# Patient Record
Sex: FEMALE | Race: WHITE | NOT HISPANIC OR LATINO | Employment: OTHER | URBAN - METROPOLITAN AREA
[De-identification: names, ages, dates, MRNs, and addresses within clinical notes are randomized per-mention and may not be internally consistent; named-entity substitution may affect disease eponyms.]

---

## 2017-04-03 ENCOUNTER — HISTORICAL (OUTPATIENT)
Dept: RADIOLOGY | Facility: HOSPITAL | Age: 62
End: 2017-04-03

## 2017-10-03 ENCOUNTER — HISTORICAL (OUTPATIENT)
Dept: ADMINISTRATIVE | Facility: HOSPITAL | Age: 62
End: 2017-10-03

## 2017-10-25 ENCOUNTER — HISTORICAL (OUTPATIENT)
Dept: LAB | Facility: HOSPITAL | Age: 62
End: 2017-10-25

## 2018-01-11 ENCOUNTER — HISTORICAL (OUTPATIENT)
Dept: LAB | Facility: HOSPITAL | Age: 63
End: 2018-01-11

## 2018-01-16 ENCOUNTER — HISTORICAL (OUTPATIENT)
Dept: RADIOLOGY | Facility: HOSPITAL | Age: 63
End: 2018-01-16

## 2018-01-19 ENCOUNTER — HISTORICAL (OUTPATIENT)
Dept: RADIOLOGY | Facility: HOSPITAL | Age: 63
End: 2018-01-19

## 2018-01-25 ENCOUNTER — HISTORICAL (OUTPATIENT)
Dept: ADMINISTRATIVE | Facility: HOSPITAL | Age: 63
End: 2018-01-25

## 2018-03-15 ENCOUNTER — HISTORICAL (OUTPATIENT)
Dept: HEMATOLOGY/ONCOLOGY | Facility: CLINIC | Age: 63
End: 2018-03-15

## 2018-05-16 ENCOUNTER — HISTORICAL (OUTPATIENT)
Dept: LAB | Facility: HOSPITAL | Age: 63
End: 2018-05-16

## 2018-05-16 ENCOUNTER — HISTORICAL (OUTPATIENT)
Dept: PREADMISSION TESTING | Facility: HOSPITAL | Age: 63
End: 2018-05-16

## 2018-05-21 ENCOUNTER — HISTORICAL (OUTPATIENT)
Dept: PREADMISSION TESTING | Facility: HOSPITAL | Age: 63
End: 2018-05-21

## 2018-05-22 ENCOUNTER — HISTORICAL (OUTPATIENT)
Dept: ADMINISTRATIVE | Facility: HOSPITAL | Age: 63
End: 2018-05-22

## 2018-07-13 ENCOUNTER — HISTORICAL (OUTPATIENT)
Dept: HEMATOLOGY/ONCOLOGY | Facility: CLINIC | Age: 63
End: 2018-07-13

## 2018-07-13 ENCOUNTER — HISTORICAL (OUTPATIENT)
Dept: LAB | Facility: HOSPITAL | Age: 63
End: 2018-07-13

## 2018-10-16 ENCOUNTER — HISTORICAL (OUTPATIENT)
Dept: ADMINISTRATIVE | Facility: HOSPITAL | Age: 63
End: 2018-10-16

## 2018-11-30 ENCOUNTER — HISTORICAL (OUTPATIENT)
Dept: HEMATOLOGY/ONCOLOGY | Facility: CLINIC | Age: 63
End: 2018-11-30

## 2019-03-29 ENCOUNTER — HISTORICAL (OUTPATIENT)
Dept: HEMATOLOGY/ONCOLOGY | Facility: CLINIC | Age: 64
End: 2019-03-29

## 2019-06-27 ENCOUNTER — HISTORICAL (OUTPATIENT)
Dept: ADMINISTRATIVE | Facility: HOSPITAL | Age: 64
End: 2019-06-27

## 2019-06-27 LAB — CRC RECOMMENDATION EXT: NORMAL

## 2019-10-11 ENCOUNTER — HISTORICAL (OUTPATIENT)
Dept: HEMATOLOGY/ONCOLOGY | Facility: CLINIC | Age: 64
End: 2019-10-11

## 2020-01-27 ENCOUNTER — HISTORICAL (OUTPATIENT)
Dept: LAB | Facility: HOSPITAL | Age: 65
End: 2020-01-27

## 2020-02-12 ENCOUNTER — HISTORICAL (OUTPATIENT)
Dept: ADMINISTRATIVE | Facility: HOSPITAL | Age: 65
End: 2020-02-12

## 2020-04-13 ENCOUNTER — HISTORICAL (OUTPATIENT)
Dept: HEMATOLOGY/ONCOLOGY | Facility: CLINIC | Age: 65
End: 2020-04-13

## 2020-05-27 ENCOUNTER — HISTORICAL (OUTPATIENT)
Dept: ADMINISTRATIVE | Facility: HOSPITAL | Age: 65
End: 2020-05-27

## 2020-07-22 ENCOUNTER — HISTORICAL (OUTPATIENT)
Dept: RADIOLOGY | Facility: HOSPITAL | Age: 65
End: 2020-07-22

## 2020-10-01 ENCOUNTER — HISTORICAL (OUTPATIENT)
Dept: ADMINISTRATIVE | Facility: HOSPITAL | Age: 65
End: 2020-10-01

## 2020-10-08 ENCOUNTER — HISTORICAL (OUTPATIENT)
Dept: HEMATOLOGY/ONCOLOGY | Facility: CLINIC | Age: 65
End: 2020-10-08

## 2020-10-08 LAB — CEA SERPL-MCNC: <1.73 NG/ML (ref 0–3)

## 2020-12-17 ENCOUNTER — HISTORICAL (OUTPATIENT)
Dept: ADMINISTRATIVE | Facility: HOSPITAL | Age: 65
End: 2020-12-17

## 2020-12-17 LAB
ALBUMIN SERPL-MCNC: 4.3 GM/DL (ref 3.4–4.8)
ALP SERPL-CCNC: 53 UNIT/L (ref 40–150)
ALT SERPL-CCNC: 32 UNIT/L (ref 0–55)
AST SERPL-CCNC: 33 UNIT/L (ref 5–34)
BILIRUB SERPL-MCNC: 0.5 MG/DL
BILIRUBIN DIRECT+TOT PNL SERPL-MCNC: 0.2 MG/DL (ref 0–0.5)
BILIRUBIN DIRECT+TOT PNL SERPL-MCNC: 0.3 MG/DL (ref 0–0.8)
CHOLEST SERPL-MCNC: 167 MG/DL
CHOLEST/HDLC SERPL: 2 {RATIO} (ref 0–5)
HDLC SERPL-MCNC: 87 MG/DL (ref 35–60)
LDLC SERPL CALC-MCNC: 69 MG/DL (ref 50–140)
LIVER PROFILE INTERP: NORMAL
PROT SERPL-MCNC: 6.7 GM/DL (ref 5.8–7.6)
TRIGL SERPL-MCNC: 57 MG/DL (ref 37–140)
VLDLC SERPL CALC-MCNC: 11 MG/DL

## 2021-04-14 ENCOUNTER — HISTORICAL (OUTPATIENT)
Dept: HEMATOLOGY/ONCOLOGY | Facility: CLINIC | Age: 66
End: 2021-04-14

## 2021-04-14 LAB
ABS NEUT (OLG): 3.22 X10(3)/MCL (ref 2.1–9.2)
ALBUMIN SERPL-MCNC: 4.3 GM/DL (ref 3.4–4.8)
ALBUMIN/GLOB SERPL: 1.6 RATIO (ref 1.1–2)
ALP SERPL-CCNC: 71 UNIT/L (ref 40–150)
ALT SERPL-CCNC: 26 UNIT/L (ref 0–55)
AST SERPL-CCNC: 31 UNIT/L (ref 5–34)
BASOPHILS # BLD AUTO: 0 X10(3)/MCL (ref 0–0.2)
BASOPHILS NFR BLD AUTO: 0.6 %
BILIRUB SERPL-MCNC: 0.4 MG/DL
BILIRUBIN DIRECT+TOT PNL SERPL-MCNC: 0.2 MG/DL (ref 0–0.5)
BILIRUBIN DIRECT+TOT PNL SERPL-MCNC: 0.2 MG/DL (ref 0–0.8)
BUN SERPL-MCNC: 14.7 MG/DL (ref 9.8–20.1)
CALCIUM SERPL-MCNC: 10.1 MG/DL (ref 8.4–10.2)
CEA SERPL-MCNC: <1.73 NG/ML (ref 0–3)
CHLORIDE SERPL-SCNC: 104 MMOL/L (ref 98–107)
CO2 SERPL-SCNC: 30 MMOL/L (ref 23–31)
CREAT SERPL-MCNC: 0.74 MG/DL (ref 0.55–1.02)
EOSINOPHIL # BLD AUTO: 0.1 X10(3)/MCL (ref 0–0.9)
EOSINOPHIL NFR BLD AUTO: 2.1 %
ERYTHROCYTE [DISTWIDTH] IN BLOOD BY AUTOMATED COUNT: 12.3 % (ref 11.5–17)
GLOBULIN SER-MCNC: 2.7 GM/DL (ref 2.4–3.5)
GLUCOSE SERPL-MCNC: 84 MG/DL (ref 82–115)
HCT VFR BLD AUTO: 40.7 % (ref 37–47)
HGB BLD-MCNC: 13.4 GM/DL (ref 12–16)
LYMPHOCYTES # BLD AUTO: 1.4 X10(3)/MCL (ref 0.6–4.6)
LYMPHOCYTES NFR BLD AUTO: 26.2 %
MCH RBC QN AUTO: 32.1 PG (ref 27–31)
MCHC RBC AUTO-ENTMCNC: 32.9 GM/DL (ref 33–36)
MCV RBC AUTO: 97.6 FL (ref 80–94)
MONOCYTES # BLD AUTO: 0.5 X10(3)/MCL (ref 0.1–1.3)
MONOCYTES NFR BLD AUTO: 10.1 %
NEUTROPHILS # BLD AUTO: 3.2 X10(3)/MCL (ref 2.1–9.2)
NEUTROPHILS NFR BLD AUTO: 61 %
PLATELET # BLD AUTO: 243 X10(3)/MCL (ref 130–400)
PMV BLD AUTO: 10.1 FL (ref 9.4–12.4)
POTASSIUM SERPL-SCNC: 4 MMOL/L (ref 3.5–5.1)
PROT SERPL-MCNC: 7 GM/DL (ref 5.8–7.6)
RBC # BLD AUTO: 4.17 X10(6)/MCL (ref 4.2–5.4)
SODIUM SERPL-SCNC: 145 MMOL/L (ref 136–145)
WBC # SPEC AUTO: 5.3 X10(3)/MCL (ref 4.5–11.5)

## 2021-04-20 ENCOUNTER — HISTORICAL (OUTPATIENT)
Dept: ADMINISTRATIVE | Facility: HOSPITAL | Age: 66
End: 2021-04-20

## 2021-04-20 LAB
ALBUMIN SERPL-MCNC: 4.2 GM/DL (ref 3.4–4.8)
ALBUMIN/GLOB SERPL: 1.6 RATIO (ref 1.1–2)
ALP SERPL-CCNC: 71 UNIT/L (ref 40–150)
ALT SERPL-CCNC: 26 UNIT/L (ref 0–55)
APPEARANCE, UA: CLEAR
AST SERPL-CCNC: 31 UNIT/L (ref 5–34)
BACTERIA SPEC CULT: ABNORMAL /HPF
BILIRUB SERPL-MCNC: 0.6 MG/DL
BILIRUB UR QL STRIP: NEGATIVE
BILIRUBIN DIRECT+TOT PNL SERPL-MCNC: 0.2 MG/DL (ref 0–0.5)
BILIRUBIN DIRECT+TOT PNL SERPL-MCNC: 0.4 MG/DL (ref 0–0.8)
BUN SERPL-MCNC: 16.1 MG/DL (ref 9.8–20.1)
CALCIUM SERPL-MCNC: 10.1 MG/DL (ref 8.4–10.2)
CHLORIDE SERPL-SCNC: 105 MMOL/L (ref 98–107)
CHOLEST SERPL-MCNC: 196 MG/DL
CHOLEST/HDLC SERPL: 2 {RATIO} (ref 0–5)
CO2 SERPL-SCNC: 28 MMOL/L (ref 23–31)
COLOR UR: YELLOW
CREAT SERPL-MCNC: 0.69 MG/DL (ref 0.55–1.02)
GLOBULIN SER-MCNC: 2.7 GM/DL (ref 2.4–3.5)
GLUCOSE (UA): NEGATIVE
GLUCOSE SERPL-MCNC: 84 MG/DL (ref 82–115)
HDLC SERPL-MCNC: 79 MG/DL (ref 35–60)
HGB UR QL STRIP: NEGATIVE
KETONES UR QL STRIP: NEGATIVE
LDLC SERPL CALC-MCNC: 95 MG/DL (ref 50–140)
LEUKOCYTE ESTERASE UR QL STRIP: ABNORMAL
NITRITE UR QL STRIP: NEGATIVE
PH UR STRIP: 6 [PH] (ref 5–9)
POTASSIUM SERPL-SCNC: 4.7 MMOL/L (ref 3.5–5.1)
PROT SERPL-MCNC: 6.9 GM/DL (ref 5.8–7.6)
PROT UR QL STRIP: NEGATIVE
RBC #/AREA URNS HPF: ABNORMAL /[HPF]
SODIUM SERPL-SCNC: 144 MMOL/L (ref 136–145)
SP GR UR STRIP: 1.02 (ref 1–1.03)
SQUAMOUS EPITHELIAL, UA: ABNORMAL /HPF
TRIGL SERPL-MCNC: 109 MG/DL (ref 37–140)
UROBILINOGEN UR STRIP-ACNC: 0.2
VLDLC SERPL CALC-MCNC: 22 MG/DL
WBC #/AREA URNS HPF: ABNORMAL /HPF

## 2021-10-12 ENCOUNTER — HISTORICAL (OUTPATIENT)
Dept: HEMATOLOGY/ONCOLOGY | Facility: CLINIC | Age: 66
End: 2021-10-12

## 2022-04-07 ENCOUNTER — HISTORICAL (OUTPATIENT)
Dept: ADMINISTRATIVE | Facility: HOSPITAL | Age: 67
End: 2022-04-07
Payer: MEDICARE

## 2022-04-11 ENCOUNTER — HISTORICAL (OUTPATIENT)
Dept: HEMATOLOGY/ONCOLOGY | Facility: CLINIC | Age: 67
End: 2022-04-11

## 2022-04-11 LAB
ABS NEUT (OLG): 4.49 (ref 2.1–9.2)
ALBUMIN SERPL-MCNC: 4.1 G/DL (ref 3.4–4.8)
ALBUMIN/GLOB SERPL: 1.5 {RATIO} (ref 1.1–2)
ALP SERPL-CCNC: 62 U/L (ref 40–150)
ALT SERPL-CCNC: 20 U/L (ref 0–55)
AST SERPL-CCNC: 25 U/L (ref 5–34)
BASOPHILS # BLD AUTO: 0 10*3/UL (ref 0–0.2)
BASOPHILS NFR BLD AUTO: 0.6 %
BILIRUB SERPL-MCNC: 0.4 MG/DL
BILIRUBIN DIRECT+TOT PNL SERPL-MCNC: 0.2 (ref 0–0.5)
BILIRUBIN DIRECT+TOT PNL SERPL-MCNC: 0.2 (ref 0–0.8)
BUN SERPL-MCNC: 17.7 MG/DL (ref 9.8–20.1)
CALCIUM SERPL-MCNC: 10.3 MG/DL (ref 8.7–10.5)
CEA SERPL-MCNC: <1.73 NG/ML (ref 0–3)
CHLORIDE SERPL-SCNC: 105 MMOL/L (ref 98–107)
CO2 SERPL-SCNC: 25 MMOL/L (ref 23–31)
CREAT SERPL-MCNC: 0.73 MG/DL (ref 0.55–1.02)
EOSINOPHIL # BLD AUTO: 0.3 10*3/UL (ref 0–0.9)
EOSINOPHIL NFR BLD AUTO: 3.8 %
ERYTHROCYTE [DISTWIDTH] IN BLOOD BY AUTOMATED COUNT: 12.1 % (ref 11.5–17)
GLOBULIN SER-MCNC: 2.8 G/DL (ref 2.4–3.5)
GLUCOSE SERPL-MCNC: 96 MG/DL (ref 82–115)
HCT VFR BLD AUTO: 38.7 % (ref 37–47)
HEMOLYSIS INTERF INDEX SERPL-ACNC: 10
HGB BLD-MCNC: 12.9 G/DL (ref 12–16)
ICTERIC INTERF INDEX SERPL-ACNC: 1
LIPEMIC INTERF INDEX SERPL-ACNC: 23
LYMPHOCYTES # BLD AUTO: 1.8 10*3/UL (ref 0.6–4.6)
LYMPHOCYTES NFR BLD AUTO: 24.6 %
MANUAL DIFF? (OHS): NO
MCH RBC QN AUTO: 32 PG (ref 27–31)
MCHC RBC AUTO-ENTMCNC: 33.3 G/DL (ref 33–36)
MCV RBC AUTO: 96 FL (ref 80–94)
MONOCYTES # BLD AUTO: 0.6 10*3/UL (ref 0.1–1.3)
MONOCYTES NFR BLD AUTO: 8.5 %
NEUTROPHILS # BLD AUTO: 4.5 10*3/UL (ref 2.1–9.2)
NEUTROPHILS NFR BLD AUTO: 62.2 %
PLATELET # BLD AUTO: 293 10*3/UL (ref 130–400)
PMV BLD AUTO: 10.3 FL (ref 9.4–12.4)
POTASSIUM SERPL-SCNC: 4.4 MMOL/L (ref 3.5–5.1)
PROT SERPL-MCNC: 6.9 G/DL (ref 5.8–7.6)
RBC # BLD AUTO: 4.03 10*6/UL (ref 4.2–5.4)
SODIUM SERPL-SCNC: 143 MMOL/L (ref 136–145)
WBC # SPEC AUTO: 7.2 10*3/UL (ref 4.5–11.5)

## 2022-04-24 VITALS
HEIGHT: 61 IN | SYSTOLIC BLOOD PRESSURE: 110 MMHG | BODY MASS INDEX: 25.7 KG/M2 | OXYGEN SATURATION: 98 % | WEIGHT: 136.13 LBS | DIASTOLIC BLOOD PRESSURE: 72 MMHG

## 2022-04-28 ENCOUNTER — HISTORICAL (OUTPATIENT)
Dept: ADMINISTRATIVE | Facility: HOSPITAL | Age: 67
End: 2022-04-28
Payer: MEDICARE

## 2022-04-28 LAB
ABS NEUT (OLG): 2.83 (ref 2.1–9.2)
ALBUMIN SERPL-MCNC: 4.2 G/DL (ref 3.4–4.8)
ALBUMIN/GLOB SERPL: 1.4 {RATIO} (ref 1.1–2)
ALP SERPL-CCNC: 66 U/L (ref 40–150)
ALT SERPL-CCNC: 30 U/L (ref 0–55)
APPEARANCE, UA: CLEAR
AST SERPL-CCNC: 38 U/L (ref 5–34)
BACTERIA SPEC CULT: NORMAL
BASOPHILS # BLD AUTO: 0 10*3/UL (ref 0–0.2)
BASOPHILS NFR BLD AUTO: 1 %
BILIRUB SERPL-MCNC: 0.5 MG/DL
BILIRUB UR QL STRIP: NEGATIVE
BILIRUBIN DIRECT+TOT PNL SERPL-MCNC: 0.2 (ref 0–0.5)
BILIRUBIN DIRECT+TOT PNL SERPL-MCNC: 0.3 (ref 0–0.8)
BUN SERPL-MCNC: 20.1 MG/DL (ref 9.8–20.1)
CALCIUM SERPL-MCNC: 10.2 MG/DL (ref 8.7–10.5)
CHLORIDE SERPL-SCNC: 104 MMOL/L (ref 98–107)
CHOLEST SERPL-MCNC: 181 MG/DL
CHOLEST/HDLC SERPL: 2 {RATIO} (ref 0–5)
CO2 SERPL-SCNC: 30 MMOL/L (ref 23–31)
COLOR UR: YELLOW
CREAT SERPL-MCNC: 0.73 MG/DL (ref 0.55–1.02)
EOSINOPHIL # BLD AUTO: 0.3 10*3/UL (ref 0–0.9)
EOSINOPHIL NFR BLD AUTO: 6 %
ERYTHROCYTE [DISTWIDTH] IN BLOOD BY AUTOMATED COUNT: 12.5 % (ref 11.5–17)
GLOBULIN SER-MCNC: 2.9 G/DL (ref 2.4–3.5)
GLUCOSE (UA): NEGATIVE
GLUCOSE SERPL-MCNC: 88 MG/DL (ref 82–115)
HCT VFR BLD AUTO: 40.6 % (ref 37–47)
HDLC SERPL-MCNC: 83 MG/DL (ref 35–60)
HEMOLYSIS INTERF INDEX SERPL-ACNC: 0
HGB BLD-MCNC: 13.3 G/DL (ref 12–16)
HGB UR QL STRIP: NEGATIVE
ICTERIC INTERF INDEX SERPL-ACNC: 1
KETONES UR QL STRIP: NEGATIVE
LDLC SERPL CALC-MCNC: 82 MG/DL (ref 50–140)
LEUKOCYTE ESTERASE UR QL STRIP: NORMAL
LIPEMIC INTERF INDEX SERPL-ACNC: 1
LYMPHOCYTES # BLD AUTO: 1.2 10*3/UL (ref 0.6–4.6)
LYMPHOCYTES NFR BLD AUTO: 24 %
MANUAL DIFF? (OHS): NO
MCH RBC QN AUTO: 31.6 PG (ref 27–31)
MCHC RBC AUTO-ENTMCNC: 32.8 G/DL (ref 33–36)
MCV RBC AUTO: 96.4 FL (ref 80–94)
MONOCYTES # BLD AUTO: 0.6 10*3/UL (ref 0.1–1.3)
MONOCYTES NFR BLD AUTO: 12 %
NEUTROPHILS # BLD AUTO: 2.83 10*3/UL (ref 2.1–9.2)
NEUTROPHILS NFR BLD AUTO: 57 %
NITRITE UR QL STRIP: NEGATIVE
PH UR STRIP: 6.5 [PH] (ref 5–9)
PLATELET # BLD AUTO: 233 10*3/UL (ref 130–400)
PMV BLD AUTO: 10.8 FL (ref 9.4–12.4)
POTASSIUM SERPL-SCNC: 4.8 MMOL/L (ref 3.5–5.1)
PROT SERPL-MCNC: 7.1 G/DL (ref 5.8–7.6)
PROT UR QL STRIP: NEGATIVE
RBC # BLD AUTO: 4.21 10*6/UL (ref 4.2–5.4)
RBC #/AREA URNS HPF: NORMAL /[HPF] (ref 0–2)
SODIUM SERPL-SCNC: 140 MMOL/L (ref 136–145)
SP GR UR STRIP: 1.01 (ref 1–1.03)
SQUAMOUS EPITHELIAL, UA: NORMAL (ref 0–4)
TRIGL SERPL-MCNC: 78 MG/DL (ref 37–140)
UA WBC MAN: NORMAL (ref 0–2)
UROBILINOGEN UR STRIP-ACNC: 0.2
VLDLC SERPL CALC-MCNC: 16 MG/DL
WBC # SPEC AUTO: 5 10*3/UL (ref 4.5–11.5)

## 2022-05-02 ENCOUNTER — PATIENT MESSAGE (OUTPATIENT)
Dept: INTERNAL MEDICINE | Facility: CLINIC | Age: 67
End: 2022-05-02

## 2022-05-02 DIAGNOSIS — M25.539 PAIN IN WRIST, UNSPECIFIED LATERALITY: Primary | ICD-10-CM

## 2022-05-03 ENCOUNTER — PATIENT MESSAGE (OUTPATIENT)
Dept: INTERNAL MEDICINE | Facility: CLINIC | Age: 67
End: 2022-05-03
Payer: MEDICARE

## 2022-05-03 RX ORDER — ANASTROZOLE 1 MG/1
1 TABLET ORAL DAILY
COMMUNITY
Start: 2022-03-10 | End: 2022-06-06

## 2022-05-18 DIAGNOSIS — Z13.6 ENCOUNTER FOR SCREENING FOR CARDIOVASCULAR DISORDERS: Primary | ICD-10-CM

## 2022-05-18 DIAGNOSIS — E78.5 HYPERLIPIDEMIA, UNSPECIFIED HYPERLIPIDEMIA TYPE: ICD-10-CM

## 2022-06-08 ENCOUNTER — TELEPHONE (OUTPATIENT)
Dept: INTERNAL MEDICINE | Facility: CLINIC | Age: 67
End: 2022-06-08
Payer: MEDICARE

## 2022-06-08 DIAGNOSIS — M85.80 OSTEOPENIA, UNSPECIFIED LOCATION: ICD-10-CM

## 2022-06-08 DIAGNOSIS — D50.9 IRON DEFICIENCY ANEMIA, UNSPECIFIED IRON DEFICIENCY ANEMIA TYPE: ICD-10-CM

## 2022-06-08 DIAGNOSIS — E78.5 HYPERLIPIDEMIA, UNSPECIFIED HYPERLIPIDEMIA TYPE: Primary | ICD-10-CM

## 2022-06-08 NOTE — TELEPHONE ENCOUNTER
----- Message from Ajay Correa MA sent at 6/8/2022  8:32 AM CDT -----  Regarding: PV 6/15/22 @ 3:20 Dr. Hills  1. Are there any outstanding tasks in the patient's chart? Yes, fasting labs    2. Is there any documentation in the chart? No    3.Has patient been seen in an ER, Urgent care clinic, or been admitted since last visit?  If yes, When, where, and why    4. Has patient seen any other healthcare providers since last visit?  If yes, when, where, and why    5. Has patient had any bloodwork or XR done since last visit?    6. Is patient signed up for patient portal?

## 2022-06-14 RX ORDER — ESZOPICLONE 2 MG/1
2 TABLET, FILM COATED ORAL NIGHTLY
COMMUNITY
End: 2022-07-25 | Stop reason: SDUPTHER

## 2022-06-14 RX ORDER — ASPIRIN 81 MG/1
81 TABLET ORAL DAILY
COMMUNITY
End: 2022-06-15

## 2022-06-14 RX ORDER — AMOXICILLIN 500 MG
1 CAPSULE ORAL DAILY
COMMUNITY

## 2022-06-14 RX ORDER — ROSUVASTATIN CALCIUM 10 MG/1
10 TABLET, COATED ORAL DAILY
COMMUNITY
Start: 2022-05-29 | End: 2022-08-26

## 2022-06-14 RX ORDER — CELECOXIB 200 MG/1
1 CAPSULE ORAL
COMMUNITY
Start: 2022-05-04 | End: 2022-06-15

## 2022-06-14 RX ORDER — FLUTICASONE PROPIONATE 50 MCG
1 SPRAY, SUSPENSION (ML) NASAL DAILY
COMMUNITY

## 2022-06-14 RX ORDER — PANTOPRAZOLE SODIUM 40 MG/1
1 TABLET, DELAYED RELEASE ORAL DAILY
COMMUNITY
Start: 2022-03-02 | End: 2022-10-25

## 2022-06-15 ENCOUNTER — OFFICE VISIT (OUTPATIENT)
Dept: INTERNAL MEDICINE | Facility: CLINIC | Age: 67
End: 2022-06-15
Payer: MEDICARE

## 2022-06-15 VITALS
HEART RATE: 95 BPM | HEIGHT: 60 IN | SYSTOLIC BLOOD PRESSURE: 120 MMHG | WEIGHT: 137.88 LBS | OXYGEN SATURATION: 97 % | BODY MASS INDEX: 27.07 KG/M2 | TEMPERATURE: 98 F | RESPIRATION RATE: 16 BRPM | DIASTOLIC BLOOD PRESSURE: 76 MMHG

## 2022-06-15 DIAGNOSIS — Z00.00 MEDICARE ANNUAL WELLNESS VISIT, SUBSEQUENT: Primary | ICD-10-CM

## 2022-06-15 DIAGNOSIS — E78.5 HYPERLIPIDEMIA, UNSPECIFIED HYPERLIPIDEMIA TYPE: ICD-10-CM

## 2022-06-15 DIAGNOSIS — M85.80 OSTEOPENIA, UNSPECIFIED LOCATION: ICD-10-CM

## 2022-06-15 DIAGNOSIS — K21.9 GERD WITHOUT ESOPHAGITIS: ICD-10-CM

## 2022-06-15 DIAGNOSIS — C50.412 MALIGNANT NEOPLASM OF UPPER-OUTER QUADRANT OF LEFT FEMALE BREAST, UNSPECIFIED ESTROGEN RECEPTOR STATUS: ICD-10-CM

## 2022-06-15 PROCEDURE — 90677 PCV20 VACCINE IM: CPT | Mod: ,,, | Performed by: INTERNAL MEDICINE

## 2022-06-15 PROCEDURE — G0439 PR MEDICARE ANNUAL WELLNESS SUBSEQUENT VISIT: ICD-10-PCS | Mod: ,,, | Performed by: INTERNAL MEDICINE

## 2022-06-15 PROCEDURE — 90677 PNEUMOCOCCAL CONJUGATE VACCINE 20-VALENT: ICD-10-PCS | Mod: ,,, | Performed by: INTERNAL MEDICINE

## 2022-06-15 PROCEDURE — G0009 PNEUMOCOCCAL CONJUGATE VACCINE 20-VALENT: ICD-10-PCS | Mod: ,,, | Performed by: INTERNAL MEDICINE

## 2022-06-15 PROCEDURE — G0439 PPPS, SUBSEQ VISIT: HCPCS | Mod: ,,, | Performed by: INTERNAL MEDICINE

## 2022-06-15 PROCEDURE — G0009 ADMIN PNEUMOCOCCAL VACCINE: HCPCS | Mod: ,,, | Performed by: INTERNAL MEDICINE

## 2022-06-15 RX ORDER — LORATADINE 10 MG/1
10 TABLET ORAL NIGHTLY
COMMUNITY
End: 2023-12-19

## 2022-06-15 RX ORDER — GLUCOSAMINE/CHONDRO SU A 500-400 MG
2 TABLET ORAL DAILY
COMMUNITY

## 2022-06-15 RX ORDER — MINERAL OIL
180 ENEMA (ML) RECTAL NIGHTLY
COMMUNITY

## 2022-06-15 RX ORDER — MELATONIN 1 MG
2 TABLET,CHEWABLE ORAL NIGHTLY
COMMUNITY
End: 2023-06-19

## 2022-06-15 RX ORDER — UBIDECARENONE 30 MG
100 CAPSULE ORAL DAILY
COMMUNITY

## 2022-06-15 RX ORDER — SODIUM CHLORIDE/SODIUM BICARB 2.7 %
2 AEROSOL, SPRAY (GRAM) NASAL NIGHTLY
COMMUNITY

## 2022-06-15 NOTE — ASSESSMENT & PLAN NOTE
General health maintenance education given  Labs reviewed  Age-appropriate exams are up-to-date  PCV 20 today

## 2022-06-15 NOTE — PROGRESS NOTES
Patient Name: Ann-Marie Perea     Date of service:  6/15/22      Member ID: [id number] - (Medicare)    YOB: 1955   Gender: female   Race: White      Ethnicity: Not  or /a   Medical Record Number: [id number]     Reason for Visit:   Chief Complaint   Patient presents with    Medicare AWV        History of Present Illness:   67 year-old  female here for medicare wellness   Stage 2 invasive lobular carcinoma status post bilateral radical mastectomy with Morris See; reconstruction performed by Dr. Miller  Oncologist is Dr. Olivera   She has ER/ NC positive disease currently on anastrozole. Her daughter who is 41 was recently diagnosed with breast cancer, ductal type, hormone receptor positive.    Bone density exam 4/11/22 showed progression of osteopenia of the lumbar spine with a T score of -2.0 (previous -1.3) and stable osteopenia of both hips with a T score of -2.2 on the right and -1.5 on the left.  She does not wish to move for with treatment right now she will discuss with Dr. Olivera at her next visit in October, may be consideration for Evista?    GYN Dr. Duarte.  Mom with history of CVA in her 60s; from her carotids. She is now 87. Has a fib.  Dad with CABG in his 80s  Ingraldi for cards; calcium score 40; LDL at 82  Labs 4-28-22 reviewed  Jaydon had arthroscopic knee procedure on the right  2 COVID vaccines, never had COVID      Past Medical History:   Diagnosis Date    GERD (gastroesophageal reflux disease)     Impingement syndrome of right shoulder     Malignant neoplasm of upper-outer quadrant of left female breast       Providers regularly involved with care (specialists/suppliers):   Patient Care Team:  John Egan MD as PCP - General (Internal Medicine)     Past Surgical History:   Procedure Laterality Date    CARPAL TUNNEL RELEASE  09/18/2019    COLONOSCOPY  06/27/2019        Medication reconciliation completed.      No current facility-administered  medications for this visit.      Medications Discontinued During This Encounter   Medication Reason    aspirin (ECOTRIN) 81 MG EC tablet Patient no longer taking    celecoxib (CELEBREX) 200 MG capsule Patient no longer taking        Review of patient's allergies indicates:  No Known Allergies  Social History     Socioeconomic History    Marital status:    Tobacco Use    Smoking status: Former Smoker    Smokeless tobacco: Never Used   Substance and Sexual Activity    Alcohol use: Yes     Alcohol/week: 7.0 - 14.0 standard drinks     Types: 7 - 14 Glasses of wine per week    Drug use: Never    Sexual activity: Yes     Partners: Male        Family History   Problem Relation Age of Onset    Stroke Mother           Review of Systems   Constitutional: No fever, no chills, no night sweats, no changes in weight, no changes in appetite.  Eye: No blurring of vision, no double vision, no conjunctival injection, no acute vision loss  ENMT: No trauma, No sore throat, no rhinorrhea, no tinnitus, no headache, no vertigo, no ear pain or discharge  Respiratory: No cough, no sputum production, no shortness of breath, no hemoptysis, no wheezing.  Cardiovascular: No chest pain, no VILLARREAL, no PND, no orthopnea, no edema, no palpitations.  Gastrointestinal: No abdominal pain, nausea, no vomiting, no changes in frequency or consistency of stools, no diarrhea, no constipation, no BRBPR.  Genitourinary: No dysuria, no hematuria, no foul-smelling urine, no straining to urinate, no increase in frequency  Heme/Lymph: No easy bruising/ bleeding, no swollen or painful glands.  Endocrine: No polyuria, no polydipsia, no polyphagia, no heat or cold intolerance.  Musculoskeletal: No muscle pain, no muscle weakness, no joint pain, no difficulties on reference to range of motion.  Integumentary: No rash, no pruritis.  Neurologic: No sensory deficit, no motor deficit, no headache, no neck rigidity, no paresthesias, no syncope.  Psychiatric:  no mood changes, no anxiety, no depression, no tension, no memory defecits  All Other ROS: Negative with exception of what is documented in the history of present illness  Vitals:    06/15/22 1512   BP: 120/76   Pulse: 95   Resp: 16   Temp: 97.8 °F (36.6 °C)   TempSrc: Temporal   SpO2: 97%   Weight: 62.6 kg (137 lb 14.4 oz)   Height: 5' (1.524 m)      Body mass index is 26.93 kg/m².       Physical Exam   General : Alert and oriented, No acute distress, afebrile.  Eye : PERRLA. EOMI. Normal conjunctiva,  HEENT : Normocephalic/ atraumati  Respiratory : Respirations are non-labored and clear to auscultation bilaterally. Symmetrical air entry bilaterally, no crackles, no wheezes, no rhonchi. No cyanosis, no clubbing.  Cardiovascular : Normal rate, Regular rhythm. No murmurs, rubs, or gallops. Pulses are 2+ throughout. No JVD. No Edema.  Gastrointestinal : Soft, nontender, non-distended, bowel sounds are present in all quadrants, no organomegaly, no guarding, no rebound.  Musculoskeletal : Normal range of motion throughout. No muscle tenderness.  Integumentary : Warm, moist, intact.  Neurologic : Alert, Oriented, No focal neurologic deficits.  Psychiatric : Cooperative, Appropriate mood & affect.    Health Maintenance Topics with due status: Not Due       Topic Last Completion Date    TETANUS VACCINE 08/23/2016    Colorectal Cancer Screening 06/27/2019    DEXA Scan 07/22/2020    Lipid Panel 04/28/2022    Influenza Vaccine Not Due        Social History     Tobacco Use   Smoking Status Former Smoker   Smokeless Tobacco Never Used          Immunization History   Administered Date(s) Administered    COVID-19, MRNA, LN-S, PF (MODERNA FULL 0.5 ML DOSE) 07/15/2021, 08/12/2021    Pneumococcal Conjugate - 13 Valent 04/28/2021    Tdap 08/23/2016    Zoster 10/19/2016, 10/06/2020, 03/23/2021    Zoster Recombinant 03/23/2021        Lab Results   Component Value Date    CHOL 181 04/28/2022    TRIG 78 04/28/2022    HDL 83  04/28/2022    TOTALCHOLEST 2 04/28/2022        The following assessments were completed and reviewed.  Timed Get Up and Go  Depression screening  Whisper Test  Nutrition screening  Cognitive function screening  ADLs/Functional status assessment  Physical Activity Questionnaire (PAQ)  Functional/Cognitive Status: Disability Status      Diagnoses with ICD-10 code:    ICD-10-CM ICD-9-CM   1. Medicare annual wellness visit, subsequent  Z00.00 V70.0   2. Hyperlipidemia, unspecified hyperlipidemia type  E78.5 272.4   3. GERD without esophagitis  K21.9 530.81   4. Malignant neoplasm of upper-outer quadrant of left female breast, unspecified estrogen receptor status  C50.412 174.4   5. Osteopenia, unspecified location  M85.80 733.90      Diagnoses with associated orders:  Problem List Items Addressed This Visit        Cardiac/Vascular    Hyperlipidemia       Oncology    Malignant neoplasm of upper-outer quadrant of left female breast       GI    GERD without esophagitis    Current Assessment & Plan     Stable on Protonix 3 times per week              Orthopedic    Osteopenia    Current Assessment & Plan     She remains on Arimidex; Dr. Olivera states that she could come off at 5 years or she could stay on it till 7---- she will stay on it until she sees him again in October.  May be consideration for SERM? does not want to take bisphosphonate              Other    Medicare annual wellness visit, subsequent - Primary    Current Assessment & Plan     General health maintenance education given  Labs reviewed  Age-appropriate exams are up-to-date  PCV 20 today               Plan:  1.  Continue current medications  2.  Side effects and expected results discussed  3.  Diet and exercise as tolerated  4.  Nutritional support  5.  Health maintenance updated accordingly  6.  Call with increased complaints or concerns    Screening/prevention plan for the next 10 years:  Goal of exercise is 150 minutes a week.   Encouraged to follow  balanced diet with daily servings of fresh fruit and vegetables.  Make sure to schedule all health maintenance appointments to achieve health care goals.   Annual check up is due every 12 months with your designated provider/care team.  Health Maintenance Due   Topic Date Due    Hepatitis C Screening  Never done    Shingles Vaccine (2 of 3) 05/18/2021    COVID-19 Vaccine (3 - Booster for Moderna series) 01/12/2022    Pneumococcal Vaccines (Age 65+) (2 - PPSV23 or PCV20) 04/28/2022      Follow-up: Follow up in about 1 year (around 6/15/2023) for WELLNESS.   Patient Instructions:  There are no Patient Instructions on file for this visit.

## 2022-06-15 NOTE — PROGRESS NOTES
"TIME UP & GO (TUG)  Test begins with patient sitting back in standard arm chair.   When "Go" is said, the patient stands up and walks 10 feet at a normal pace before turning, walking back and sitting down.    Observe the patients postural stability, gait, stride length, and sway.  Check all that apply:  ? [] Slow tentative pace  ? [] Loss of balance  ? [] Short strides  ? [] Little or no arm swing  ? [] Steadying self on walls  ? [] Shuffling  ? [] En bloc turning  ? [] Not using assistive device properly    Time in seconds:  8 Seconds  (Older adults who takes = or > 12 seconds to complete TUG is at risk for falling.      WHISPER TEST  Test begins with patient standing arms length away (2 feet), facing away from examiner.  Patient covers the ear that is NOT being tested with one finger over the tragus.  Whisper a number-letter-number combination.  If a patient gets 3 total letters and/or numbers correct after a second attempt, it is considered a pass.    Right Ear: passed    [] 8-M-3   [] K-5-R   [] 2-K-7   [] S-4-G  Left Ear: passed       [] 8-M-3   [] K-5-R   [] 2-K-7   [] S-4-G      VISION SCREENING  unable to measure      MINI-COGNITIVE  Three Word Registration   []Version 1 []Version 2 []Version 3 []Version 4 []Version 5 []Version 6   [facility] Captain Daughter   [place] [employer] Garden Heaven   Chair Table Baby Finger Picture [place]     Word Recall 3 points  Clock Drawing 2      HOME SAFETY QUESTIONNAIRE  Are emergency numbers kept by the phone and regularly updated? Yes  Are all household members aware of the dangers of smoking, especially in bed? Yes  Are working smoke alarm(s) and fire extinguisher(s) available for use? Yes  Do all household members know how to use them? Yes  Are firearms stored unloaded and securely locked? N/A  Have throw rugs been removed or fastened down? Yes  Are non-slip mats in all bathtubs and showers?  Yes  Do all stairways have a railing or " banister?  Yes  Are sidewalks and all outdoor steps clear of tools, toys and other articles?  Yes  Are doorways, halls, and stairs free of clutter?  Yes  Are all electrical cords in working order, easily seen, and not run under rug/carpets or wrapped around nails? Yes          Answers for HPI/ROS submitted by the patient on 6/15/2022  activity change: No  unexpected weight change: No  neck pain: No  hearing loss: No  rhinorrhea: No  trouble swallowing: No  eye discharge: No  visual disturbance: No  chest tightness: No  wheezing: No  chest pain: No  palpitations: No  blood in stool: No  constipation: No  vomiting: No  diarrhea: No  polydipsia: No  polyuria: No  difficulty urinating: No  hematuria: No  menstrual problem: No  dysuria: No  joint swelling: No  arthralgias: No  headaches: No  weakness: No  confusion: No  dysphoric mood: No

## 2022-06-15 NOTE — ASSESSMENT & PLAN NOTE
She remains on Arimidex; Dr. Olivera states that she could come off at 5 years or she could stay on it till 7---- she will stay on it until she sees him again in October.  May be consideration for SERM? does not want to take bisphosphonate

## 2022-07-24 ENCOUNTER — PATIENT MESSAGE (OUTPATIENT)
Dept: INTERNAL MEDICINE | Facility: CLINIC | Age: 67
End: 2022-07-24
Payer: MEDICARE

## 2022-07-25 DIAGNOSIS — G47.00 INSOMNIA, UNSPECIFIED TYPE: Primary | ICD-10-CM

## 2022-07-25 RX ORDER — ESZOPICLONE 2 MG/1
2 TABLET, FILM COATED ORAL NIGHTLY
Qty: 30 TABLET | Refills: 2 | Status: SHIPPED | OUTPATIENT
Start: 2022-07-25 | End: 2022-10-20 | Stop reason: SDUPTHER

## 2022-09-19 PROBLEM — Z00.00 MEDICARE ANNUAL WELLNESS VISIT, SUBSEQUENT: Status: RESOLVED | Noted: 2022-06-15 | Resolved: 2022-09-19

## 2022-10-05 ENCOUNTER — OFFICE VISIT (OUTPATIENT)
Dept: URGENT CARE | Facility: CLINIC | Age: 67
End: 2022-10-05
Payer: MEDICARE

## 2022-10-05 VITALS
OXYGEN SATURATION: 99 % | SYSTOLIC BLOOD PRESSURE: 129 MMHG | BODY MASS INDEX: 26.9 KG/M2 | HEART RATE: 88 BPM | TEMPERATURE: 99 F | RESPIRATION RATE: 18 BRPM | DIASTOLIC BLOOD PRESSURE: 77 MMHG | WEIGHT: 137 LBS | HEIGHT: 60 IN

## 2022-10-05 DIAGNOSIS — J02.9 SORE THROAT: Primary | ICD-10-CM

## 2022-10-05 DIAGNOSIS — J02.9 ACUTE PHARYNGITIS, UNSPECIFIED ETIOLOGY: ICD-10-CM

## 2022-10-05 DIAGNOSIS — C50.412 MALIGNANT NEOPLASM OF UPPER-OUTER QUADRANT OF LEFT FEMALE BREAST, UNSPECIFIED ESTROGEN RECEPTOR STATUS: Primary | ICD-10-CM

## 2022-10-05 LAB
CTP QC/QA: YES
S PYO RRNA THROAT QL PROBE: NEGATIVE

## 2022-10-05 PROCEDURE — 99203 OFFICE O/P NEW LOW 30 MIN: CPT | Mod: ,,, | Performed by: PHYSICIAN ASSISTANT

## 2022-10-05 PROCEDURE — 99203 PR OFFICE/OUTPT VISIT, NEW, LEVL III, 30-44 MIN: ICD-10-PCS | Mod: ,,, | Performed by: PHYSICIAN ASSISTANT

## 2022-10-05 PROCEDURE — 87880 STREP A ASSAY W/OPTIC: CPT | Mod: QW,,, | Performed by: PHYSICIAN ASSISTANT

## 2022-10-05 PROCEDURE — 87880 POCT RAPID STREP A: ICD-10-PCS | Mod: QW,,, | Performed by: PHYSICIAN ASSISTANT

## 2022-10-05 NOTE — PROGRESS NOTES
Subjective:       Patient ID: Ann-Marie Perea is a 67 y.o. female.    Vitals:  height is 5' (1.524 m) and weight is 62.1 kg (137 lb). Her oral temperature is 98.6 °F (37 °C). Her blood pressure is 129/77 and her pulse is 88. Her respiration is 18 and oxygen saturation is 99%.     Chief Complaint: Sore Throat    Sore throat started last night.  Patient denies any cough fever neck stiffness rash shortness of breath or GI symptoms.  ROS    Objective:      Physical Exam   Constitutional: She is oriented to person, place, and time. She appears well-developed. She is cooperative.  Non-toxic appearance. She does not appear ill. No distress.   HENT:   Head: Normocephalic and atraumatic.   Ears:   Right Ear: Hearing, tympanic membrane, external ear and ear canal normal.   Left Ear: Hearing, tympanic membrane, external ear and ear canal normal.   Nose: Nose normal. No nasal deformity. No epistaxis.   Mouth/Throat: Uvula is midline, oropharynx is clear and moist and mucous membranes are normal. No trismus in the jaw. Normal dentition. No uvula swelling. No oropharyngeal exudate, posterior oropharyngeal edema or posterior oropharyngeal erythema.   Eyes: Conjunctivae and lids are normal. No scleral icterus.   Neck: Trachea normal and phonation normal. Neck supple. No edema present. No erythema present. No neck rigidity present.   Cardiovascular: Normal rate, regular rhythm, normal heart sounds and normal pulses.   Pulmonary/Chest: Effort normal and breath sounds normal. No respiratory distress. She has no decreased breath sounds. She has no rhonchi.   Abdominal: Normal appearance.   Musculoskeletal: Normal range of motion.         General: No deformity. Normal range of motion.   Neurological: She is alert and oriented to person, place, and time. She exhibits normal muscle tone. Coordination normal.   Skin: Skin is warm, dry, intact, not diaphoretic and not pale.   Psychiatric: Her speech is normal and behavior is normal.  Judgment and thought content normal.   Nursing note and vitals reviewed.             Previous History      Review of patient's allergies indicates:  No Known Allergies    Past Medical History:   Diagnosis Date    GERD (gastroesophageal reflux disease)     Impingement syndrome of right shoulder     Malignant neoplasm of upper-outer quadrant of left female breast      Current Outpatient Medications   Medication Instructions    anastrozole (ARIMIDEX) 1 mg Tab TAKE 1 TABLET BY MOUTH EVERY DAY    B. animalis-vitamin D3 1 billion cell- 10 mcg/5 drops Drop 2 drops, Oral, Daily    co-enzyme Q-10 100 mg, Oral, Daily    cranberry fruit concentrate (AZO CRANBERRY ORAL) 1 tablet, Oral, Daily    eszopiclone (LUNESTA) 2 mg, Oral, Nightly    fexofenadine (ALLEGRA) 180 mg, Oral, Nightly    fluticasone propionate (FLONASE) 50 mcg/actuation nasal spray 1 spray, Each Nostril, Daily    glucosamine-chondroitin 500-400 mg tablet 2 tablets, Oral, Daily    loratadine (CLARITIN) 10 mg, Oral, Nightly    melatonin 2 mg, Oral, Nightly    multivit/folic acid/vit K1 (ONE-A-DAY WOMEN'S 50 PLUS ORAL) 1 tablet, Oral, Daily    omega-3 fatty acids/fish oil (FISH OIL-OMEGA-3 FATTY ACIDS) 300-1,000 mg capsule 1 capsule, Oral, Daily    pantoprazole (PROTONIX) 40 MG tablet 1 tablet, Oral, Daily    rosuvastatin (CRESTOR) 10 MG tablet TAKE 1 TABLET BY MOUTH EVERY DAY    sodium bicarb-sodium chloride (NASAMIST) 2.7 % SprA 2 sprays, Nasal, Nightly     Past Surgical History:   Procedure Laterality Date    CARPAL TUNNEL RELEASE  09/18/2019    COLONOSCOPY  06/27/2019     Family History   Problem Relation Age of Onset    Stroke Mother        Social History     Tobacco Use    Smoking status: Former    Smokeless tobacco: Never   Substance Use Topics    Alcohol use: Yes     Alcohol/week: 7.0 - 14.0 standard drinks     Types: 7 - 14 Glasses of wine per week    Drug use: Never        Physical Exam      Vital Signs Reviewed   /77   Pulse 88   Temp 98.6 °F (37  °C) (Oral)   Resp 18   Ht 5' (1.524 m)   Wt 62.1 kg (137 lb)   SpO2 99%   BMI 26.76 kg/m²        Procedures    Procedures     Labs     Results for orders placed or performed in visit on 10/05/22   POCT rapid strep A   Result Value Ref Range    Rapid Strep A Screen Negative Negative     Acceptable Yes      Assessment:       1. Sore throat    2. Acute pharyngitis, unspecified etiology          Plan:         Sore throat  -     POCT rapid strep A    Acute pharyngitis, unspecified etiology       Drink plenty of fluids.     Get plenty of rest.     Tylenol or Motrin as needed.     Go to the ER with any significant change or worsening of symptoms.     Follow up with your primary care doctor.

## 2022-10-13 RX ORDER — DICLOFENAC SODIUM 10 MG/G
4 GEL TOPICAL 4 TIMES DAILY
COMMUNITY
Start: 2022-08-15 | End: 2023-06-19

## 2022-10-13 NOTE — PROGRESS NOTES
Subjective:       Patient ID: Ann-Marie Perea is a 67 y.o. female.    Chief Complaint:  I'm doing well    Diagnosis: Multifocal stage IIA left breast invasive lobular carcinoma (T2 N0(i+) M0), 2.9/2.1 cm, grade II, 1 IHC+ SN,                        ER/ME +, HER-2 negative, Oncotype RS 8                    S/p bilateral mastectomies                    Postmenopausal with intact uterus        + COVID-19 vaccinated    Current Treatment: Arimidex 1 mg/d (started 6/17)    Clinical History:  Patient initially presented 3/17 with mild left nipple retraction on self examination. Her last mammogram had been 11/14. She had a history of a benign breast biopsy on the left side several years earlier. She was referred for a mammogram 3/21/17 showing 3 suspicious lesions in the left breast. 2 of these lesions were biopsied and positive for invasive lobular carcinoma. CT PET scan 4/3/17 showed mildly increased hypermetabolic activity in the left breast compared to the right but no other abnormal activity to suggest metastatic disease. She underwent bilateral mastectomies with placement of tissue expanders and left sentinel lymph node dissection 4/17/17. Right breast showed atypical lobular hyperplasia and sclerosing adenosis. Left breast showed 2 foci of grade II invasive lobular carcinoma measuring 2.9 cm at 3:00 and 2.1 cm at 8:00. [place] lymph node #1 showed isolated tumor cells by IHC. [place] lymph nodes # 2 and 3 were negative and 3 additional axillary nodes were also negative. ER +96%, ME +94% and HER-2 negative by FISH. She was seen postoperatively by Dr. Lee. Oncotype testing showed a low risk recurrence score of 8 which was equivalent to a 6% 10 year risk of recurrence in Tamoxifen treated patients. Adjuvant hormonal therapy was recommended with an aromatase inhibitor for 5-10 years. Baseline bone density exam 6/9/17 showed osteopenia of the lumbar spine with a T score of -1.8 and both hips with T-scores of  -1.5 on the left and -1.1 on the right. There was osteoporosis of the left femoral neck at -2.6. She was started on a calcium and vitamin D supplement. Treatment was discussed with Prolia but she had significant concerns regarding the side effect profile. Her postoperative course was complicated by development of an infected left tissue expander requiring removal and antibiotic therapy. She eventually required a left latissimus flap and was able to have her expander replaced.    [facility]17 to establish ongoing Oncology care due to the FDC of Dr. Rivero. She was on Arimidex 1 mg daily. She still had bilateral tissue expanders in place. Her daughter had been diagnosed with breast cancer at the age of 41 in [place]. The patient tested negative for BRCA1 and BRCA2 mutations. When she was seen for follow-up 7/19/18, she complained of discomfort and thickening in the left axilla. She had no palpable masses on her clinical exam. Ultrasound of the left breast and axilla 7/25/18 showed benign appearing fibrofatty tissue with no masses, cyst or other abnormalities.     Interval History  She returns to the office today by herself for a 6 month surveillance appointment.  She is on extended adjuvant hormonal therapy with Arimidex.  She continues to tolerate treatment well.  She is not having any significant joint symptoms.  She has occasional, mild hot flashes.  She reports no recent illnesses or problems.  She has no symptoms suspicious for disease recurrence.  She reports no changes on her self examination.  Laboratory testing for this visit showed no significant abnormalities.  Bone density exam 4/11/22 showed osteopenia of the lumbar spine with a T-score of -2.0 (previous -1.3) and stable osteopenia of both hips with a T-score of -2.2 on the right and -1.5 on the left.    Review of Systems   Constitutional:  Negative for appetite change, fatigue and fever.   HENT:   "Negative for mouth sores, sore throat and trouble swallowing.    Eyes: Negative.    Respiratory:  Negative for cough, chest tightness and shortness of breath.    Cardiovascular:  Negative for chest pain, palpitations and leg swelling.   Gastrointestinal:  Negative for abdominal distention, abdominal pain, blood in stool, change in bowel habit, constipation, diarrhea, nausea, vomiting and change in bowel habit.   Genitourinary:  Negative for dysuria, frequency and urgency.   Musculoskeletal:  Negative for arthralgias and back pain.   Integumentary:  Negative for rash and mole/lesion.   Hematological:  Negative for adenopathy. Does not bruise/bleed easily.       PMHx:  Osteopenia, venous insufficiency, restless legs  Menarche age 13, first pregnancy 21, 3 children (+ breast fed), menopause early 50s, no HRT.  PSHx:  Bilateral mastectomies 4/17, endometrial ablation, left breast biopsy, cervical fusion 12/20  SH:  Former smoker 1 PPD, quit 1980s. + Social alcohol use. Lives in [place] with her . Works as a  for her 's [employer] firm.  FH:  Her daughter was diagnosed with breast cancer age 41. A maternal first cousin also had breast cancer.       Objective:        /80   Pulse 86   Temp 98.4 °F (36.9 °C)   Resp 18   Ht 5' 1.81" (1.57 m)   Wt 62.9 kg (138 lb 10.7 oz)   BMI 25.52 kg/m²    Physical Exam  Constitutional:       Comments: Well-developed white female in NAD   HENT:      Head: Normocephalic.      Nose: Nose normal.      Mouth/Throat:      Mouth: Mucous membranes are moist.      Pharynx: Oropharynx is clear. No posterior oropharyngeal erythema.   Eyes:      Extraocular Movements: Extraocular movements intact.      Conjunctiva/sclera: Conjunctivae normal.      Pupils: Pupils are equal, round, and reactive to light.   Neck:      Comments: Well-healed incision left anterior neck.  Cardiovascular:      Rate and Rhythm: Normal rate and regular rhythm.      Heart sounds: No murmur " heard.  Pulmonary:      Comments: Lungs clear to auscultation.  Chest:      Comments: Bilateral mastectomies with implant reconstruction and left latissimus dorsi flap.  No suspicious masses, skin changes or axillary nodes bilaterally.  Abdominal:      General: Abdomen is flat. Bowel sounds are normal. There is no distension.      Palpations: Abdomen is soft. There is no mass.      Tenderness: There is no abdominal tenderness.   Musculoskeletal:         General: No swelling or tenderness. Normal range of motion.      Cervical back: Neck supple. No tenderness.   Lymphadenopathy:      Cervical: No cervical adenopathy.   Skin:     General: Skin is warm and dry.      Findings: No rash.   Neurological:      General: No focal deficit present.      Mental Status: She is alert and oriented to person, place, and time.      Cranial Nerves: No cranial nerve deficit.      Gait: Gait normal.     ECOG SCORE    0 - Fully active-able to carry on all pre-disease performance without restriction          LABORATORY  Recent Results (from the past 168 hour(s))   Cancer Antigen 27-29    Collection Time: 10/14/22 12:37 PM   Result Value Ref Range    Breast Carcinoma Assoc Ag(CA 27.29) <12.0 <=38.0 U/mL   CEA    Collection Time: 10/14/22 12:37 PM   Result Value Ref Range    Carcinoembryonic Antigen <1.73 0.00 - 3.00 ng/mL   Comprehensive Metabolic Panel    Collection Time: 10/14/22 12:37 PM   Result Value Ref Range    Sodium Level 140 136 - 145 mmol/L    Potassium Level 4.0 3.5 - 5.1 mmol/L    Chloride 106 98 - 107 mmol/L    Carbon Dioxide 25 23 - 31 mmol/L    Glucose Level 94 82 - 115 mg/dL    Blood Urea Nitrogen 22.3 (H) 9.8 - 20.1 mg/dL    Creatinine 0.70 0.55 - 1.02 mg/dL    Calcium Level Total 10.1 8.4 - 10.2 mg/dL    Protein Total 7.1 5.8 - 7.6 gm/dL    Albumin Level 4.4 3.4 - 4.8 gm/dL    Globulin 2.7 2.4 - 3.5 gm/dL    Albumin/Globulin Ratio 1.6 1.1 - 2.0 ratio    Bilirubin Total 0.4 <=1.5 mg/dL    Alkaline Phosphatase 58 40 - 150  unit/L    Alanine Aminotransferase 22 0 - 55 unit/L    Aspartate Aminotransferase 23 5 - 34 unit/L    eGFR >60 mls/min/1.73/m2   CBC with Differential    Collection Time: 10/14/22 12:37 PM   Result Value Ref Range    WBC 10.9 4.5 - 11.5 x10(3)/mcL    RBC 4.01 (L) 4.20 - 5.40 x10(6)/mcL    Hgb 12.6 12.0 - 16.0 gm/dL    Hct 39.0 37.0 - 47.0 %    MCV 97.3 (H) 80.0 - 94.0 fL    MCH 31.4 (H) 27.0 - 31.0 pg    MCHC 32.3 (L) 33.0 - 36.0 mg/dL    RDW 12.4 11.5 - 17.0 %    Platelet 280 130 - 400 x10(3)/mcL    MPV 10.0 7.4 - 10.4 fL    Neut % 82.9 %    Lymph % 9.5 %    Mono % 6.8 %    Eos % 0.0 %    Basophil % 0.2 %    Lymph # 1.03 0.6 - 4.6 x10(3)/mcL    Neut # 9.0 2.1 - 9.2 x10(3)/mcL    Mono # 0.74 0.1 - 1.3 x10(3)/mcL    Eos # 0.00 0 - 0.9 x10(3)/mcL    Baso # 0.02 0 - 0.2 x10(3)/mcL    IG# 0.07 (H) 0 - 0.04 x10(3)/mcL    IG% 0.6 %        Assessment:   Multifocal stage IIA left breast invasive lobular carcinoma (T2 N0(i) M0) - [place]  Osteopenia      Plan:   Continue extended adjuvant hormonal therapy with Arimidex, tolerating well.  RTC in 6 months for a follow-up visit and clinical exam with repeat laboratory.      DIONICIO ROMO MD    Other Physicians  Dr. John Miller

## 2022-10-14 ENCOUNTER — LAB VISIT (OUTPATIENT)
Dept: LAB | Facility: HOSPITAL | Age: 67
End: 2022-10-14
Attending: INTERNAL MEDICINE
Payer: MEDICARE

## 2022-10-14 DIAGNOSIS — C50.412 MALIGNANT NEOPLASM OF UPPER-OUTER QUADRANT OF LEFT FEMALE BREAST, UNSPECIFIED ESTROGEN RECEPTOR STATUS: ICD-10-CM

## 2022-10-14 LAB
ALBUMIN SERPL-MCNC: 4.4 GM/DL (ref 3.4–4.8)
ALBUMIN/GLOB SERPL: 1.6 RATIO (ref 1.1–2)
ALP SERPL-CCNC: 58 UNIT/L (ref 40–150)
ALT SERPL-CCNC: 22 UNIT/L (ref 0–55)
AST SERPL-CCNC: 23 UNIT/L (ref 5–34)
BASOPHILS # BLD AUTO: 0.02 X10(3)/MCL (ref 0–0.2)
BASOPHILS NFR BLD AUTO: 0.2 %
BILIRUBIN DIRECT+TOT PNL SERPL-MCNC: 0.4 MG/DL
BUN SERPL-MCNC: 22.3 MG/DL (ref 9.8–20.1)
CALCIUM SERPL-MCNC: 10.1 MG/DL (ref 8.4–10.2)
CEA SERPL-MCNC: <1.73 NG/ML (ref 0–3)
CHLORIDE SERPL-SCNC: 106 MMOL/L (ref 98–107)
CO2 SERPL-SCNC: 25 MMOL/L (ref 23–31)
CREAT SERPL-MCNC: 0.7 MG/DL (ref 0.55–1.02)
EOSINOPHIL # BLD AUTO: 0 X10(3)/MCL (ref 0–0.9)
EOSINOPHIL NFR BLD AUTO: 0 %
ERYTHROCYTE [DISTWIDTH] IN BLOOD BY AUTOMATED COUNT: 12.4 % (ref 11.5–17)
GFR SERPLBLD CREATININE-BSD FMLA CKD-EPI: >60 MLS/MIN/1.73/M2
GLOBULIN SER-MCNC: 2.7 GM/DL (ref 2.4–3.5)
GLUCOSE SERPL-MCNC: 94 MG/DL (ref 82–115)
HCT VFR BLD AUTO: 39 % (ref 37–47)
HGB BLD-MCNC: 12.6 GM/DL (ref 12–16)
IMM GRANULOCYTES # BLD AUTO: 0.07 X10(3)/MCL (ref 0–0.04)
IMM GRANULOCYTES NFR BLD AUTO: 0.6 %
LYMPHOCYTES # BLD AUTO: 1.03 X10(3)/MCL (ref 0.6–4.6)
LYMPHOCYTES NFR BLD AUTO: 9.5 %
MCH RBC QN AUTO: 31.4 PG (ref 27–31)
MCHC RBC AUTO-ENTMCNC: 32.3 MG/DL (ref 33–36)
MCV RBC AUTO: 97.3 FL (ref 80–94)
MONOCYTES # BLD AUTO: 0.74 X10(3)/MCL (ref 0.1–1.3)
MONOCYTES NFR BLD AUTO: 6.8 %
NEUTROPHILS # BLD AUTO: 9 X10(3)/MCL (ref 2.1–9.2)
NEUTROPHILS NFR BLD AUTO: 82.9 %
PLATELET # BLD AUTO: 280 X10(3)/MCL (ref 130–400)
PMV BLD AUTO: 10 FL (ref 7.4–10.4)
POTASSIUM SERPL-SCNC: 4 MMOL/L (ref 3.5–5.1)
PROT SERPL-MCNC: 7.1 GM/DL (ref 5.8–7.6)
RBC # BLD AUTO: 4.01 X10(6)/MCL (ref 4.2–5.4)
SODIUM SERPL-SCNC: 140 MMOL/L (ref 136–145)
WBC # SPEC AUTO: 10.9 X10(3)/MCL (ref 4.5–11.5)

## 2022-10-14 PROCEDURE — 82378 CARCINOEMBRYONIC ANTIGEN: CPT

## 2022-10-14 PROCEDURE — 86300 IMMUNOASSAY TUMOR CA 15-3: CPT

## 2022-10-14 PROCEDURE — 36415 COLL VENOUS BLD VENIPUNCTURE: CPT

## 2022-10-14 PROCEDURE — 85025 COMPLETE CBC W/AUTO DIFF WBC: CPT

## 2022-10-14 PROCEDURE — 80053 COMPREHEN METABOLIC PANEL: CPT

## 2022-10-17 LAB — CANCER AG27-29 SERPL-ACNC: <12 U/ML

## 2022-10-19 ENCOUNTER — OFFICE VISIT (OUTPATIENT)
Dept: HEMATOLOGY/ONCOLOGY | Facility: CLINIC | Age: 67
End: 2022-10-19
Payer: MEDICARE

## 2022-10-19 VITALS
WEIGHT: 138.69 LBS | TEMPERATURE: 98 F | SYSTOLIC BLOOD PRESSURE: 136 MMHG | HEART RATE: 86 BPM | BODY MASS INDEX: 25.52 KG/M2 | RESPIRATION RATE: 18 BRPM | DIASTOLIC BLOOD PRESSURE: 80 MMHG | HEIGHT: 62 IN

## 2022-10-19 DIAGNOSIS — Z17.0 MALIGNANT NEOPLASM OF UPPER-OUTER QUADRANT OF LEFT BREAST IN FEMALE, ESTROGEN RECEPTOR POSITIVE: Primary | ICD-10-CM

## 2022-10-19 DIAGNOSIS — C50.412 MALIGNANT NEOPLASM OF UPPER-OUTER QUADRANT OF LEFT BREAST IN FEMALE, ESTROGEN RECEPTOR POSITIVE: Primary | ICD-10-CM

## 2022-10-19 PROCEDURE — 99999 PR PBB SHADOW E&M-EST. PATIENT-LVL IV: ICD-10-PCS | Mod: PBBFAC,,, | Performed by: INTERNAL MEDICINE

## 2022-10-19 PROCEDURE — 99999 PR PBB SHADOW E&M-EST. PATIENT-LVL IV: CPT | Mod: PBBFAC,,, | Performed by: INTERNAL MEDICINE

## 2022-10-19 PROCEDURE — 99214 OFFICE O/P EST MOD 30 MIN: CPT | Mod: S$PBB,,, | Performed by: INTERNAL MEDICINE

## 2022-10-19 PROCEDURE — 99214 PR OFFICE/OUTPT VISIT, EST, LEVL IV, 30-39 MIN: ICD-10-PCS | Mod: S$PBB,,, | Performed by: INTERNAL MEDICINE

## 2022-10-19 PROCEDURE — 99214 OFFICE O/P EST MOD 30 MIN: CPT | Mod: PBBFAC | Performed by: INTERNAL MEDICINE

## 2022-10-24 DIAGNOSIS — K21.9 GERD WITHOUT ESOPHAGITIS: Primary | ICD-10-CM

## 2022-10-25 RX ORDER — PANTOPRAZOLE SODIUM 40 MG/1
TABLET, DELAYED RELEASE ORAL
Qty: 90 TABLET | Refills: 4 | Status: SHIPPED | OUTPATIENT
Start: 2022-10-25 | End: 2023-07-19 | Stop reason: SDUPTHER

## 2022-11-17 ENCOUNTER — OFFICE VISIT (OUTPATIENT)
Dept: URGENT CARE | Facility: CLINIC | Age: 67
End: 2022-11-17
Payer: MEDICARE

## 2022-11-17 VITALS
TEMPERATURE: 99 F | WEIGHT: 138 LBS | BODY MASS INDEX: 26.06 KG/M2 | RESPIRATION RATE: 16 BRPM | DIASTOLIC BLOOD PRESSURE: 84 MMHG | SYSTOLIC BLOOD PRESSURE: 161 MMHG | HEART RATE: 90 BPM | OXYGEN SATURATION: 98 % | HEIGHT: 61 IN

## 2022-11-17 DIAGNOSIS — Z01.810 PREOPERATIVE CARDIOVASCULAR EXAMINATION: ICD-10-CM

## 2022-11-17 DIAGNOSIS — H10.9 CONJUNCTIVITIS, UNSPECIFIED CONJUNCTIVITIS TYPE, UNSPECIFIED LATERALITY: ICD-10-CM

## 2022-11-17 DIAGNOSIS — J32.9 SINUSITIS, UNSPECIFIED CHRONICITY, UNSPECIFIED LOCATION: Primary | ICD-10-CM

## 2022-11-17 DIAGNOSIS — Z01.818 PREOPERATIVE CLEARANCE: ICD-10-CM

## 2022-11-17 PROCEDURE — 99213 OFFICE O/P EST LOW 20 MIN: CPT | Mod: ,,, | Performed by: FAMILY MEDICINE

## 2022-11-17 PROCEDURE — 99213 PR OFFICE/OUTPT VISIT, EST, LEVL III, 20-29 MIN: ICD-10-PCS | Mod: ,,, | Performed by: FAMILY MEDICINE

## 2022-11-17 RX ORDER — TOBRAMYCIN 3 MG/ML
1 SOLUTION/ DROPS OPHTHALMIC EVERY 4 HOURS
Qty: 5 ML | Refills: 0 | Status: SHIPPED | OUTPATIENT
Start: 2022-11-17 | End: 2022-11-24

## 2022-11-17 RX ORDER — AMOXICILLIN AND CLAVULANATE POTASSIUM 875; 125 MG/1; MG/1
1 TABLET, FILM COATED ORAL EVERY 12 HOURS
Qty: 14 TABLET | Refills: 0 | Status: SHIPPED | OUTPATIENT
Start: 2022-11-17 | End: 2022-11-24

## 2022-11-17 RX ORDER — OFLOXACIN 3 MG/ML
1 SOLUTION/ DROPS OPHTHALMIC 4 TIMES DAILY
Qty: 5 ML | Refills: 0 | Status: SHIPPED | OUTPATIENT
Start: 2022-11-17 | End: 2022-11-17

## 2022-11-17 NOTE — PROGRESS NOTES
"Subjective:       Patient ID: Ann-Marie Perea is a 67 y.o. female.    Vitals:  height is 5' 1" (1.549 m) and weight is 62.6 kg (138 lb). Her oral temperature is 98.9 °F (37.2 °C). Her blood pressure is 161/84 (abnormal) and her pulse is 90. Her respiration is 16 and oxygen saturation is 98%.     Chief Complaint: eye irritation    67-year-old female presents to clinic complaining of bilateral eye redness with crusty thick discharge especially in the mornings.  Patient states this all started with an upper respiratory infection and a lot of sinus congestion postnasal drip.  Patient states she is had the sinus congestion and postnasal drip for a week and a half now.  Denies contact lens use.  Denies any changes in vision.  Denies any sharp or severe eye pain.  Reports mild sensitivity to light.      Constitution: Negative.   HENT:  Positive for sinus pressure.    Cardiovascular: Negative.    Eyes:  Positive for eye discharge and eye redness.   Respiratory: Negative.     Gastrointestinal: Negative.    Genitourinary: Negative.    Musculoskeletal: Negative.    Skin: Negative.    Allergic/Immunologic: Negative.    Neurological: Negative.    Hematologic/Lymphatic: Negative.      Objective:      Physical Exam   Constitutional: She is oriented to person, place, and time. She appears well-developed. She is cooperative.  Non-toxic appearance. She does not appear ill. No distress.   HENT:   Head: Normocephalic and atraumatic.   Ears:   Right Ear: Hearing and external ear normal.   Left Ear: Hearing and external ear normal.   Mouth/Throat: Oropharynx is clear and moist and mucous membranes are normal. Posterior oropharyngeal erythema: postnasal drip is present.   Eyes: Lids are normal. Right eye exhibits discharge (erythema of the conjunctiva bilaterally). Left eye exhibits discharge.   Neck: Trachea normal and phonation normal. Neck supple. No edema present. No erythema present. No neck rigidity present.   Pulmonary/Chest: " She has no decreased breath sounds.   Abdominal: Normal appearance.   Neurological: She is alert and oriented to person, place, and time. She exhibits normal muscle tone. Coordination normal.   Skin: Skin is warm, dry, intact, not diaphoretic and no rash.   Psychiatric: Her speech is normal and behavior is normal. Mood, judgment and thought content normal.   Nursing note and vitals reviewed.         Previous History      Review of patient's allergies indicates:  No Known Allergies    Past Medical History:   Diagnosis Date    GERD (gastroesophageal reflux disease)     Impingement syndrome of right shoulder     Malignant neoplasm of upper-outer quadrant of left female breast     Osteopenia     RLS (restless legs syndrome)     Venous insufficiency      Current Outpatient Medications   Medication Instructions    amoxicillin-clavulanate 875-125mg (AUGMENTIN) 875-125 mg per tablet 1 tablet, Oral, Every 12 hours    anastrozole (ARIMIDEX) 1 mg Tab TAKE 1 TABLET BY MOUTH EVERY DAY    B. animalis-vitamin D3 1 billion cell- 10 mcg/5 drops Drop 2 drops, Oral, Daily    co-enzyme Q-10 100 mg, Oral, Daily    cranberry fruit concentrate (AZO CRANBERRY ORAL) 1 tablet, Oral, Daily    diclofenac sodium (VOLTAREN) 4 g, Topical (Top), 4 times daily    eszopiclone (LUNESTA) 2 mg, Oral, Nightly    fexofenadine (ALLEGRA) 180 mg, Oral, Nightly    fluticasone propionate (FLONASE) 50 mcg/actuation nasal spray 1 spray, Each Nostril, Daily    glucosamine-chondroitin 500-400 mg tablet 2 tablets, Oral, Daily    loratadine (CLARITIN) 10 mg, Oral, Nightly    melatonin 2 mg, Oral, Nightly    multivit/folic acid/vit K1 (ONE-A-DAY WOMEN'S 50 PLUS ORAL) 1 tablet, Oral, Daily    ofloxacin (OCUFLOX) 0.3 % ophthalmic solution 1 drop, Both Eyes, 4 times daily    omega-3 fatty acids/fish oil (FISH OIL-OMEGA-3 FATTY ACIDS) 300-1,000 mg capsule 1 capsule, Oral, Daily    pantoprazole (PROTONIX) 40 MG tablet TAKE 1 TABLET BY MOUTH TWICE A DAY FOR A WEEK THEN  "DAILY THEREAFTER    rosuvastatin (CRESTOR) 10 MG tablet TAKE 1 TABLET BY MOUTH EVERY DAY    sodium bicarb-sodium chloride (NASAMIST) 2.7 % SprA 2 sprays, Nasal, Nightly     Past Surgical History:   Procedure Laterality Date    BILATERAL MASTECTOMY  04/2017    BREAST BIOPSY Left     CARPAL TUNNEL RELEASE  09/18/2019    CERVICAL FUSION  12/2020    COLONOSCOPY  06/27/2019    ENDOMETRIAL ABLATION      KNEE SURGERY       Family History   Problem Relation Age of Onset    Stroke Mother     Breast cancer Daughter 41    Breast cancer Maternal Cousin        Social History     Tobacco Use    Smoking status: Former     Packs/day: 1.00     Types: Cigarettes    Smokeless tobacco: Never   Substance Use Topics    Alcohol use: Yes     Alcohol/week: 7.0 - 14.0 standard drinks     Types: 7 - 14 Glasses of wine per week    Drug use: Never        Physical Exam      Vital Signs Reviewed   BP (!) 161/84   Pulse 90   Temp 98.9 °F (37.2 °C) (Oral)   Resp 16   Ht 5' 1" (1.549 m)   Wt 62.6 kg (138 lb)   SpO2 98%   BMI 26.07 kg/m²        Procedures    Procedures     Labs     Results for orders placed or performed in visit on 10/14/22   Cancer Antigen 27-29   Result Value Ref Range    Breast Carcinoma Assoc Ag(CA 27.29) <12.0 <=38.0 U/mL   CEA   Result Value Ref Range    Carcinoembryonic Antigen <1.73 0.00 - 3.00 ng/mL   Comprehensive Metabolic Panel   Result Value Ref Range    Sodium Level 140 136 - 145 mmol/L    Potassium Level 4.0 3.5 - 5.1 mmol/L    Chloride 106 98 - 107 mmol/L    Carbon Dioxide 25 23 - 31 mmol/L    Glucose Level 94 82 - 115 mg/dL    Blood Urea Nitrogen 22.3 (H) 9.8 - 20.1 mg/dL    Creatinine 0.70 0.55 - 1.02 mg/dL    Calcium Level Total 10.1 8.4 - 10.2 mg/dL    Protein Total 7.1 5.8 - 7.6 gm/dL    Albumin Level 4.4 3.4 - 4.8 gm/dL    Globulin 2.7 2.4 - 3.5 gm/dL    Albumin/Globulin Ratio 1.6 1.1 - 2.0 ratio    Bilirubin Total 0.4 <=1.5 mg/dL    Alkaline Phosphatase 58 40 - 150 unit/L    Alanine Aminotransferase 22 0 " - 55 unit/L    Aspartate Aminotransferase 23 5 - 34 unit/L    eGFR >60 mls/min/1.73/m2   CBC with Differential   Result Value Ref Range    WBC 10.9 4.5 - 11.5 x10(3)/mcL    RBC 4.01 (L) 4.20 - 5.40 x10(6)/mcL    Hgb 12.6 12.0 - 16.0 gm/dL    Hct 39.0 37.0 - 47.0 %    MCV 97.3 (H) 80.0 - 94.0 fL    MCH 31.4 (H) 27.0 - 31.0 pg    MCHC 32.3 (L) 33.0 - 36.0 mg/dL    RDW 12.4 11.5 - 17.0 %    Platelet 280 130 - 400 x10(3)/mcL    MPV 10.0 7.4 - 10.4 fL    Neut % 82.9 %    Lymph % 9.5 %    Mono % 6.8 %    Eos % 0.0 %    Basophil % 0.2 %    Lymph # 1.03 0.6 - 4.6 x10(3)/mcL    Neut # 9.0 2.1 - 9.2 x10(3)/mcL    Mono # 0.74 0.1 - 1.3 x10(3)/mcL    Eos # 0.00 0 - 0.9 x10(3)/mcL    Baso # 0.02 0 - 0.2 x10(3)/mcL    IG# 0.07 (H) 0 - 0.04 x10(3)/mcL    IG% 0.6 %       Assessment:       1. Sinusitis, unspecified chronicity, unspecified location    2. Conjunctivitis, unspecified conjunctivitis type, unspecified laterality          Plan:       Medications sent to pharmacy.  Wash hands before and after using the drops  Start taking an allergy pill daily such as claritin, zyrtec, allegrea or xyzal. Also start using a nasal steroid spray such as flonase or nasacort daily. If you are not being treated for high blood pressure, you can also take decongestant such as sudafed as needed. They can be purchased over the counter. If oral steroids were prescribed, start them tomorrow morning. Monitor for fever. Take tylenol/acetaminophen or ibuprofen as needed. Rest and hydrate. If symptoms persist or worsen, return to clinic or seek medical attention immediately.     Sinusitis, unspecified chronicity, unspecified location    Conjunctivitis, unspecified conjunctivitis type, unspecified laterality    Other orders  -     ofloxacin (OCUFLOX) 0.3 % ophthalmic solution; Place 1 drop into both eyes 4 (four) times daily. for 7 days  Dispense: 5 mL; Refill: 0  -     amoxicillin-clavulanate 875-125mg (AUGMENTIN) 875-125 mg per tablet; Take 1 tablet by  mouth every 12 (twelve) hours. for 7 days  Dispense: 14 tablet; Refill: 0

## 2022-11-17 NOTE — PATIENT INSTRUCTIONS
Medications sent to pharmacy.  Wash hands before and after using the drops  Start taking an allergy pill daily such as claritin, zyrtec, allegrea or xyzal. Also start using a nasal steroid spray such as flonase or nasacort daily. If you are not being treated for high blood pressure, you can also take decongestant such as sudafed as needed. They can be purchased over the counter. If oral steroids were prescribed, start them tomorrow morning. Monitor for fever. Take tylenol/acetaminophen or ibuprofen as needed. Rest and hydrate. If symptoms persist or worsen, return to clinic or seek medical attention immediately.

## 2022-12-20 ENCOUNTER — DOCUMENTATION ONLY (OUTPATIENT)
Dept: ADMINISTRATIVE | Facility: HOSPITAL | Age: 67
End: 2022-12-20
Payer: MEDICARE

## 2023-03-22 DIAGNOSIS — D68.32 INCREASE IN EXTRINSIC ANTICOAGULANT: Primary | ICD-10-CM

## 2023-03-22 DIAGNOSIS — Z01.818 PRE-OP EXAMINATION: ICD-10-CM

## 2023-03-22 DIAGNOSIS — E78.5 HYPERLIPIDEMIA, UNSPECIFIED HYPERLIPIDEMIA TYPE: ICD-10-CM

## 2023-03-28 ENCOUNTER — TELEPHONE (OUTPATIENT)
Dept: INTERNAL MEDICINE | Facility: CLINIC | Age: 68
End: 2023-03-28
Payer: MEDICARE

## 2023-03-28 NOTE — TELEPHONE ENCOUNTER
----- Message from Patrick Monae MA sent at 3/23/2023  3:53 PM CDT -----  Fasting labs needed, last wellness 06/15/2022, next appt 04/04/2023 surgery clearance.

## 2023-04-04 ENCOUNTER — OFFICE VISIT (OUTPATIENT)
Dept: INTERNAL MEDICINE | Facility: CLINIC | Age: 68
End: 2023-04-04
Payer: MEDICARE

## 2023-04-04 VITALS
OXYGEN SATURATION: 98 % | SYSTOLIC BLOOD PRESSURE: 124 MMHG | WEIGHT: 135.38 LBS | BODY MASS INDEX: 25.58 KG/M2 | HEART RATE: 73 BPM | DIASTOLIC BLOOD PRESSURE: 90 MMHG | TEMPERATURE: 98 F | RESPIRATION RATE: 18 BRPM

## 2023-04-04 DIAGNOSIS — Z17.0 MALIGNANT NEOPLASM OF UPPER-OUTER QUADRANT OF LEFT BREAST IN FEMALE, ESTROGEN RECEPTOR POSITIVE: ICD-10-CM

## 2023-04-04 DIAGNOSIS — M17.11 PRIMARY OSTEOARTHRITIS OF RIGHT KNEE: ICD-10-CM

## 2023-04-04 DIAGNOSIS — F51.01 PRIMARY INSOMNIA: ICD-10-CM

## 2023-04-04 DIAGNOSIS — K21.9 GERD WITHOUT ESOPHAGITIS: ICD-10-CM

## 2023-04-04 DIAGNOSIS — C50.412 MALIGNANT NEOPLASM OF UPPER-OUTER QUADRANT OF LEFT BREAST IN FEMALE, ESTROGEN RECEPTOR POSITIVE: ICD-10-CM

## 2023-04-04 DIAGNOSIS — J30.2 SEASONAL ALLERGIES: ICD-10-CM

## 2023-04-04 DIAGNOSIS — R03.0 ELEVATED BLOOD PRESSURE READING: ICD-10-CM

## 2023-04-04 DIAGNOSIS — E78.5 HYPERLIPIDEMIA, UNSPECIFIED HYPERLIPIDEMIA TYPE: ICD-10-CM

## 2023-04-04 DIAGNOSIS — Z01.818 PREOP EXAM FOR INTERNAL MEDICINE: Primary | ICD-10-CM

## 2023-04-04 PROCEDURE — 99214 OFFICE O/P EST MOD 30 MIN: CPT | Mod: ,,,

## 2023-04-04 PROCEDURE — 99214 PR OFFICE/OUTPT VISIT, EST, LEVL IV, 30-39 MIN: ICD-10-PCS | Mod: ,,,

## 2023-04-04 NOTE — ASSESSMENT & PLAN NOTE
-initial blood pressure 142/98, repeat manual 124/90   -no diagnosis of hypertension  -does report a degree of discomfort due to right knee discomfort as well as some anxiety over being at new currently with new provider  -encouraged low-sodium diet as well as maintain of blood pressure log for next visit

## 2023-04-04 NOTE — ASSESSMENT & PLAN NOTE
-Patient feeling generally well today  -Denies chest pain or shortness of breath  -labs reviewed and essentially stable  -chest x-ray negative for acute cardiopulmonary process  -EKG normal sinus rhythm  -Able to perform ADLs and IADLs independently  -Mets functional score >4  -Not currently on any DOACs  -Cleared from Internal Medicine standpoint for upcoming procedure pending review of diagnostics and labs

## 2023-04-04 NOTE — PROGRESS NOTES
Patient ID: Ann-Marie Perea is a 68 y.o. female.    Chief Complaint: sugery clearance  (Pt doing well today no questions or concerns )    68-year-old female here today for requested visit as a Dr. Hills patient.  Medical comorbidities include osteopenia, GERD, RLS, venous insufficiency, and insomnia.  Also significant for Stage 2 invasive lobular carcinoma s/p bilateral radical mastectomy.  Today presents for preoperative evaluation.  Patient is scheduled to have a right total knee arthroplasty (04/17/2023) with Dr. Dale Vanegas at [facility].  Preoperative workup with CBC, CMP, PT INR, EKG, chest x-ray reviewed and essentially stable.  Denies recent chest pain, palpitations, shortness a breath, fevers, or known infections.  Vital signs stable other than elevated blood pressure without diagnosis of hypertension.  Initially blood pressure 142/98, however repeat manual 124/90.  Does contribute a portion of her elevated blood pressure to some current right knee discomfort as well as anxiety/nervousness over meeting new provider at new clinic.  Discussion had with patient regarding need to maintain low-sodium diet as well as presents with log to follow-up visit next month.  Otherwise patient fairly stable without any other acute complaints.      MEDICAL HISTORY:    Past Medical History:   Diagnosis Date    Impingement syndrome of right shoulder     Malignant neoplasm of upper-outer quadrant of left female breast     Osteopenia     RLS (restless legs syndrome)     Venous insufficiency       Past Surgical History:   Procedure Laterality Date    BILATERAL MASTECTOMY  04/2017    BREAST BIOPSY Left     BREAST SURGERY  2017    CARPAL TUNNEL RELEASE  09/18/2019    CERVICAL FUSION  12/2020    COLONOSCOPY  06/27/2019    ENDOMETRIAL ABLATION      KNEE SURGERY      SPINE SURGERY  12/30/20      Social History     Tobacco Use    Smoking status: Former     Packs/day: 1.00     Types: Cigarettes    Smokeless tobacco: Never    Tobacco  comments:     Quit over 40 years ago   Substance Use Topics    Alcohol use: Yes     Alcohol/week: 7.0 - 14.0 standard drinks     Types: 7 - 14 Glasses of wine per week    Drug use: Never          Health Maintenance Due   Topic Date Due    Hepatitis C Screening  Never done    Shingles Vaccine (2 of 2) 05/18/2021    COVID-19 Vaccine (3 - Booster for Moderna series) 10/07/2021    Influenza Vaccine (1) Never done          Patient Care Team:  John Egan MD as PCP - General (Internal Medicine)      Review of Systems   Constitutional:  Negative for fatigue and fever.   HENT:  Negative for congestion, rhinorrhea, sore throat and trouble swallowing.    Eyes:  Negative for redness and visual disturbance.   Respiratory:  Negative for cough, chest tightness and shortness of breath.    Cardiovascular:  Negative for chest pain and palpitations.   Gastrointestinal:  Negative for abdominal pain, constipation, diarrhea, nausea and vomiting.   Genitourinary:  Negative for dysuria, flank pain, frequency and urgency.   Musculoskeletal:  Positive for arthralgias. Negative for gait problem and myalgias.   Skin:  Negative for rash and wound.   Neurological:  Negative for facial asymmetry, speech difficulty, weakness and headaches.   Psychiatric/Behavioral:  The patient is nervous/anxious.    All other systems reviewed and are negative.    Objective:   BP (!) 124/90   Pulse 73   Temp 98.1 °F (36.7 °C) (Temporal)   Resp 18   Wt 61.4 kg (135 lb 6.4 oz)   SpO2 98%   BMI 25.58 kg/m²      Physical Exam  Constitutional:       General: She is not in acute distress.     Appearance: Normal appearance.   HENT:      Right Ear: Tympanic membrane, ear canal and external ear normal.      Left Ear: Tympanic membrane, ear canal and external ear normal.      Nose: Nose normal.      Mouth/Throat:      Mouth: Mucous membranes are moist.      Pharynx: Oropharynx is clear.   Eyes:      Extraocular Movements: Extraocular movements intact.       Conjunctiva/sclera: Conjunctivae normal.      Pupils: Pupils are equal, round, and reactive to light.   Cardiovascular:      Rate and Rhythm: Normal rate and regular rhythm.      Pulses: Normal pulses.      Heart sounds: Normal heart sounds. No murmur heard.    No gallop.   Pulmonary:      Effort: Pulmonary effort is normal.      Breath sounds: Normal breath sounds. No wheezing.   Abdominal:      General: Bowel sounds are normal. There is no distension.      Palpations: Abdomen is soft. There is no mass.      Tenderness: There is no abdominal tenderness. There is no guarding.   Musculoskeletal:      Right knee: No swelling or erythema. Decreased range of motion (due to discomfort).      Left knee: Normal.   Skin:     General: Skin is warm and dry.   Neurological:      Mental Status: She is alert. Mental status is at baseline.      Sensory: No sensory deficit.      Motor: No weakness.         Assessment:       ICD-10-CM ICD-9-CM   1. Preop exam for internal medicine  Z01.818 V72.83   2. Primary osteoarthritis of right knee  M17.11 715.16   3. Elevated blood pressure reading  R03.0 796.2   4. Hyperlipidemia, unspecified hyperlipidemia type  E78.5 272.4   5. GERD without esophagitis  K21.9 530.81   6. Primary insomnia  F51.01 307.42   7. Malignant neoplasm of upper-outer quadrant of left breast in female, estrogen receptor positive  C50.412 174.4    Z17.0 V86.0   8. Seasonal allergies  J30.2 477.9        Plan:     Problem List Items Addressed This Visit          ENT    Seasonal allergies     -currently on OTC antihistamine, continue  -also on fluticasone nasal, continue              Cardiac/Vascular    Hyperlipidemia     -currently on rosuvastatin, Omega fatty acid, and Co Q10, continue           Elevated blood pressure reading     -initial blood pressure 142/98, repeat manual 124/90   -no diagnosis of hypertension  -does report a degree of discomfort due to right knee discomfort as well as some anxiety over being at  new currently with new provider  -encouraged low-sodium diet as well as maintain of blood pressure log for next visit              Oncology    Malignant neoplasm of upper-outer quadrant of left female breast     -established with Oncology noted no longer on anastrozole, follow              GI    GERD without esophagitis     -stable currently on Protonix, continue              Orthopedic    Primary osteoarthritis of right knee     -upcoming right total knee arthroplasty on 04/17/2023 with Dr. Dale [facility]egas at [facility]  -surgical visits performed today              Other    Preop exam for internal medicine - Primary     -Patient feeling generally well today  -Denies chest pain or shortness of breath  -labs reviewed and essentially stable  -chest x-ray negative for acute cardiopulmonary process  -EKG normal sinus rhythm  -Able to perform ADLs and IADLs independently  -Mets functional score >4  -Not currently on any DOACs  -Cleared from Internal Medicine standpoint for upcoming procedure pending review of diagnostics and labs           Primary insomnia     -currently on Lunesta and melatonin, continue                 Follow up for Previously scheduled and PRN if need.   -plan specifics discussed above          Medication List with Changes/Refills   Current Medications    B. ANIMALIS-VITAMIN D3 1 BILLION CELL- 10 MCG/5 DROPS DROP    Take 2 drops by mouth Daily.    CO-ENZYME Q-10 30 MG CAPSULE    Take 100 mg by mouth once daily.    CRANBERRY FRUIT CONCENTRATE (AZO CRANBERRY ORAL)    Take 1 tablet by mouth once daily.    DICLOFENAC SODIUM (VOLTAREN) 1 % GEL    Apply 4 g topically 4 (four) times daily.    ESZOPICLONE (LUNESTA) 2 MG TAB    Take 1 tablet (2 mg total) by mouth every evening.    FEXOFENADINE (ALLEGRA) 180 MG TABLET    Take 180 mg by mouth nightly.    FLUTICASONE PROPIONATE (FLONASE) 50 MCG/ACTUATION NASAL SPRAY    1 spray by Each Nostril route once daily.    GLUCOSAMINE-CHONDROITIN 500-400 MG TABLET    Take 2 tablets  by mouth once daily.    LORATADINE (CLARITIN) 10 MG TABLET    Take 10 mg by mouth nightly.    MELATONIN 1 MG CHEW    Take 2 mg by mouth nightly.    MULTIVIT/FOLIC ACID/VIT K1 (ONE-A-DAY WOMEN'S 50 PLUS ORAL)    Take 1 tablet by mouth once daily.    OMEGA-3 FATTY ACIDS/FISH OIL (FISH OIL-OMEGA-3 FATTY ACIDS) 300-1,000 MG CAPSULE    Take 1 capsule by mouth once daily.    PANTOPRAZOLE (PROTONIX) 40 MG TABLET    TAKE 1 TABLET BY MOUTH TWICE A DAY FOR A WEEK THEN DAILY THEREAFTER    ROSUVASTATIN (CRESTOR) 10 MG TABLET    TAKE 1 TABLET BY MOUTH EVERY DAY    SODIUM BICARB-SODIUM CHLORIDE (NASAMIST) 2.7 % SPRA    2 sprays by Nasal route nightly.   Discontinued Medications    ANASTROZOLE (ARIMIDEX) 1 MG TAB    TAKE 1 TABLET BY MOUTH EVERY DAY

## 2023-04-04 NOTE — ASSESSMENT & PLAN NOTE
-upcoming right total knee arthroplasty on 04/17/2023 with Dr. Dale Vanegas at [facility]  -surgical visits performed today

## 2023-04-04 NOTE — LETTER
April 5, 2023      BOSSMAN Lieberman  [facility]  [address] [place] [address]  [place] [address]  Phone: [phone]  Fax: [phone]       Patient: Ann-Marie Chinchillamarisela   YOB: 1955  Date of Visit: 04/05/2023    Dear Dr Guilherme Perea  was at [facility] on 04/05/2023. The patient is cleared for their procedure  with no restrictions. If you have any questions or concerns, or if I can be of further assistance, please do not hesitate to contact me.    Sincerely,    BOSSMAN Lieberman

## 2023-04-05 ENCOUNTER — TELEPHONE (OUTPATIENT)
Dept: INTERNAL MEDICINE | Facility: CLINIC | Age: 68
End: 2023-04-05
Payer: MEDICARE

## 2023-04-05 NOTE — TELEPHONE ENCOUNTER
----- Message from Ivonne Zhu sent at 4/5/2023 10:04 AM CDT -----  Regarding: Needs Medical Advice  Type:  Needs Medical Advice    Who Called: pt      Would the patient rather a call back or a response via [facility]? stacia  Best Call Back Number: [phone]  Additional Information: pt called stating she was advised to follow up with the NP on today after being seen on 4/4. Please call pt.

## 2023-04-05 NOTE — TELEPHONE ENCOUNTER
Pt states she spoke with Tashia at [facility] and was told that all the documents were faxed over. Pt would like a call back

## 2023-05-12 ENCOUNTER — LAB VISIT (OUTPATIENT)
Dept: LAB | Facility: HOSPITAL | Age: 68
End: 2023-05-12
Attending: INTERNAL MEDICINE
Payer: MEDICARE

## 2023-05-12 DIAGNOSIS — Z17.0 MALIGNANT NEOPLASM OF UPPER-OUTER QUADRANT OF LEFT BREAST IN FEMALE, ESTROGEN RECEPTOR POSITIVE: ICD-10-CM

## 2023-05-12 DIAGNOSIS — C50.412 MALIGNANT NEOPLASM OF UPPER-OUTER QUADRANT OF LEFT BREAST IN FEMALE, ESTROGEN RECEPTOR POSITIVE: ICD-10-CM

## 2023-05-12 LAB
ALBUMIN SERPL-MCNC: 4 G/DL (ref 3.4–4.8)
ALBUMIN/GLOB SERPL: 1.6 RATIO (ref 1.1–2)
ALP SERPL-CCNC: 58 UNIT/L (ref 40–150)
ALT SERPL-CCNC: 18 UNIT/L (ref 0–55)
AST SERPL-CCNC: 22 UNIT/L (ref 5–34)
BASOPHILS # BLD AUTO: 0.03 X10(3)/MCL
BASOPHILS NFR BLD AUTO: 0.6 %
BILIRUBIN DIRECT+TOT PNL SERPL-MCNC: 0.4 MG/DL
BUN SERPL-MCNC: 13.1 MG/DL (ref 9.8–20.1)
CALCIUM SERPL-MCNC: 10.1 MG/DL (ref 8.4–10.2)
CEA SERPL-MCNC: <1.73 NG/ML (ref 0–3)
CHLORIDE SERPL-SCNC: 106 MMOL/L (ref 98–107)
CO2 SERPL-SCNC: 27 MMOL/L (ref 23–31)
CREAT SERPL-MCNC: 0.75 MG/DL (ref 0.55–1.02)
EOSINOPHIL # BLD AUTO: 0.2 X10(3)/MCL (ref 0–0.9)
EOSINOPHIL NFR BLD AUTO: 4.1 %
ERYTHROCYTE [DISTWIDTH] IN BLOOD BY AUTOMATED COUNT: 12.4 % (ref 11.5–17)
GFR SERPLBLD CREATININE-BSD FMLA CKD-EPI: >60 MLS/MIN/1.73/M2
GLOBULIN SER-MCNC: 2.5 GM/DL (ref 2.4–3.5)
GLUCOSE SERPL-MCNC: 85 MG/DL (ref 82–115)
HCT VFR BLD AUTO: 35.8 % (ref 37–47)
HGB BLD-MCNC: 11.4 G/DL (ref 12–16)
IMM GRANULOCYTES # BLD AUTO: 0.01 X10(3)/MCL (ref 0–0.04)
IMM GRANULOCYTES NFR BLD AUTO: 0.2 %
LYMPHOCYTES # BLD AUTO: 1.71 X10(3)/MCL (ref 0.6–4.6)
LYMPHOCYTES NFR BLD AUTO: 35 %
MCH RBC QN AUTO: 31.3 PG (ref 27–31)
MCHC RBC AUTO-ENTMCNC: 31.8 G/DL (ref 33–36)
MCV RBC AUTO: 98.4 FL (ref 80–94)
MONOCYTES # BLD AUTO: 0.54 X10(3)/MCL (ref 0.1–1.3)
MONOCYTES NFR BLD AUTO: 11.1 %
NEUTROPHILS # BLD AUTO: 2.39 X10(3)/MCL (ref 2.1–9.2)
NEUTROPHILS NFR BLD AUTO: 49 %
PLATELET # BLD AUTO: 239 X10(3)/MCL (ref 130–400)
PMV BLD AUTO: 9.9 FL (ref 7.4–10.4)
POTASSIUM SERPL-SCNC: 4.4 MMOL/L (ref 3.5–5.1)
PROT SERPL-MCNC: 6.5 GM/DL (ref 5.8–7.6)
RBC # BLD AUTO: 3.64 X10(6)/MCL (ref 4.2–5.4)
SODIUM SERPL-SCNC: 141 MMOL/L (ref 136–145)
WBC # SPEC AUTO: 4.88 X10(3)/MCL (ref 4.5–11.5)

## 2023-05-12 PROCEDURE — 36415 COLL VENOUS BLD VENIPUNCTURE: CPT

## 2023-05-12 PROCEDURE — 85025 COMPLETE CBC W/AUTO DIFF WBC: CPT

## 2023-05-12 PROCEDURE — 80053 COMPREHEN METABOLIC PANEL: CPT

## 2023-05-12 PROCEDURE — 82378 CARCINOEMBRYONIC ANTIGEN: CPT

## 2023-05-12 PROCEDURE — 86300 IMMUNOASSAY TUMOR CA 15-3: CPT | Mod: 90

## 2023-05-13 NOTE — PROGRESS NOTES
Subjective:       Patient ID: Ann-Marie Perea is a 68 y.o. female.    Chief Complaint:  I had a knee replacement last month    Diagnosis: Multifocal stage IIA left breast invasive lobular carcinoma (T2 N0(i+) M0), 2.9/2.1 cm, GII, 1 IHC+ SN,                        ER/MO +, HER-2 negative, Oncotype RS 8                    S/p bilateral mastectomies                    Postmenopausal with intact uterus        + COVID-19 vaccinated    Treatment History: Arimidex 6/17-12/22    Current Treatment: Observation    Clinical History:  Patient initially presented 3/17 with mild left nipple retraction on self examination. Her last mammogram had been 11/14. She had a history of a benign breast biopsy on the left side several years earlier. She was referred for a mammogram 3/21/17 showing 3 suspicious lesions in the left breast. 2 of these lesions were biopsied and positive for invasive lobular carcinoma. CT PET scan 4/3/17 showed mildly increased hypermetabolic activity in the left breast compared to the right but no other abnormal activity to suggest metastatic disease. She underwent bilateral mastectomies with placement of tissue expanders and left sentinel lymph node dissection 4/17/17. Right breast showed atypical lobular hyperplasia and sclerosing adenosis. Left breast showed 2 foci of grade II invasive lobular carcinoma measuring 2.9 cm at 3:00 and 2.1 cm at 8:00. [place] lymph node #1 showed isolated tumor cells by IHC. [place] lymph nodes # 2 and 3 were negative and 3 additional axillary nodes were also negative. ER +96%, MO +94% and HER-2 negative by FISH. She was seen postoperatively by Dr. Lee. Oncotype testing showed a low risk recurrence score of 8 which was equivalent to a 6% 10 year risk of recurrence in Tamoxifen treated patients. Adjuvant hormonal therapy was recommended with an aromatase inhibitor for 5-10 years. Baseline bone density exam 6/9/17 showed osteopenia of the lumbar spine with a T score of  -1.8 and both hips with T-scores of -1.5 on the left and -1.1 on the right. There was osteoporosis of the left femoral neck at -2.6. She was started on a calcium and vitamin D supplement. Treatment was discussed with Prolia but she had significant concerns regarding the side effect profile. Her postoperative course was complicated by development of an infected left tissue expander requiring removal and antibiotic therapy. She eventually required a left latissimus flap and was able to have her expander replaced.    [facility]17 to establish ongoing Oncology care due to the penitentiary of Dr. Rivero. She was on Arimidex 1 mg daily. She still had bilateral tissue expanders in place. Her daughter had been diagnosed with breast cancer at the age of 41 in [place]. The patient tested negative for BRCA1 and BRCA2 mutations. When she was seen for follow-up 7/19/18, she complained of discomfort and thickening in the left axilla. She had no palpable masses on her clinical exam. Ultrasound of the left breast and axilla 7/25/18 showed benign appearing fibrofatty tissue with no masses, cyst or other abnormalities.  She completed breast reconstruction with implant placement and bilateral nipple tattooing.  Arimidex therapy was discontinued 12/22 after 5-1/2 years of treatment.    Interval History  She returns to the office today by herself for a 6 month surveillance appointment.  She underwent a right total knee replacement 4/17/23.  She had no significant postoperative complications.  She is still doing outpatient physical therapy.  She has no clinical symptoms suspicious for disease recurrence.  She reports no changes on her self-breast examination.  Laboratory testing for this visit showed no significant abnormalities.  She has mild anemia due to her recent knee replacement.  She has no associated symptoms.  Bone density exam 4/11/22 showed osteopenia of the lumbar spine with a  "T-score of -2.0 (previous -1.3) and stable osteopenia of both hips with a T-score of -2.2 on the right and -1.5 on the left.    Review of Systems   Constitutional:  Negative for appetite change, fatigue and fever.   HENT:  Negative for mouth sores, sore throat and trouble swallowing.    Eyes: Negative.    Respiratory:  Negative for cough and shortness of breath.    Cardiovascular:  Negative for chest pain, palpitations and leg swelling.   Gastrointestinal:  Negative for abdominal distention, abdominal pain, constipation, diarrhea, nausea and vomiting.   Genitourinary:  Negative for dysuria, frequency and urgency.   Musculoskeletal:  Negative for arthralgias and back pain.        Right knee pain   Integumentary:  Negative for rash and mole/lesion.   Neurological:  Negative for dizziness, weakness, numbness and headaches.   Hematological:  Negative for adenopathy. Does not bruise/bleed easily.   Psychiatric/Behavioral: Negative.         PMHx:  Osteopenia, venous insufficiency, restless legs  Menarche age 13, first pregnancy 21, 3 children (+ breast fed), menopause early 50s, no HRT.  PSHx:  Bilateral mastectomies 4/17, endometrial ablation, left breast biopsy, cervical fusion 12/20  SH:  Former smoker 1 PPD, quit 1980s. + Social alcohol use. Lives in [place] with her . Works as a  for her 's law firm.  FH:  Her daughter was diagnosed with breast cancer age 41. A maternal first cousin also had breast cancer.     Objective:        /82   Pulse 79   Temp 98.4 °F (36.9 °C)   Resp 16   Ht 5' 1" (1.549 m)   Wt 57.6 kg (127 lb)   SpO2 100%   BMI 24.00 kg/m²    Physical Exam  Constitutional:       Comments: Well-developed white female in NAD   HENT:      Head: Normocephalic.      Mouth/Throat:      Mouth: Mucous membranes are moist.      Pharynx: Oropharynx is clear. No posterior oropharyngeal erythema.   Eyes:      Extraocular Movements: Extraocular movements intact.      " Conjunctiva/sclera: Conjunctivae normal.      Pupils: Pupils are equal, round, and reactive to light.   Neck:      Comments: Well-healed incision left anterior neck.  Cardiovascular:      Rate and Rhythm: Normal rate and regular rhythm.      Heart sounds: No murmur heard.  Pulmonary:      Comments: Lungs clear to auscultation.  Chest:      Comments: Bilateral mastectomies with implant reconstruction and left latissimus dorsi flap.  No suspicious masses, skin changes or axillary nodes bilaterally.  Abdominal:      General: Bowel sounds are normal. There is no distension.      Palpations: Abdomen is soft. There is no mass.      Tenderness: There is no abdominal tenderness.   Musculoskeletal:         General: No swelling or tenderness. Normal range of motion.      Cervical back: Neck supple. No tenderness.      Comments: Well-healed right knee incision   Lymphadenopathy:      Cervical: No cervical adenopathy.   Skin:     General: Skin is warm and dry.      Findings: No rash.   Neurological:      General: No focal deficit present.      Mental Status: She is alert and oriented to person, place, and time.      Cranial Nerves: No cranial nerve deficit.      Motor: No weakness.     ECOG SCORE    0 - Fully active-able to carry on all pre-disease performance without restriction          LABORATORY  Recent Results (from the past 168 hour(s))   Cancer Antigen 27-29    Collection Time: 05/12/23 12:31 PM   Result Value Ref Range    Breast Carcinoma Assoc Ag(CA 27.29) <12.0 <=38.0 U/mL   Comprehensive Metabolic Panel    Collection Time: 05/12/23 12:31 PM   Result Value Ref Range    Sodium Level 141 136 - 145 mmol/L    Potassium Level 4.4 3.5 - 5.1 mmol/L    Chloride 106 98 - 107 mmol/L    Carbon Dioxide 27 23 - 31 mmol/L    Glucose Level 85 82 - 115 mg/dL    Blood Urea Nitrogen 13.1 9.8 - 20.1 mg/dL    Creatinine 0.75 0.55 - 1.02 mg/dL    Calcium Level Total 10.1 8.4 - 10.2 mg/dL    Protein Total 6.5 5.8 - 7.6 gm/dL    Albumin  Level 4.0 3.4 - 4.8 g/dL    Globulin 2.5 2.4 - 3.5 gm/dL    Albumin/Globulin Ratio 1.6 1.1 - 2.0 ratio    Bilirubin Total 0.4 <=1.5 mg/dL    Alkaline Phosphatase 58 40 - 150 unit/L    Alanine Aminotransferase 18 0 - 55 unit/L    Aspartate Aminotransferase 22 5 - 34 unit/L    eGFR >60 mls/min/1.73/m2   CEA    Collection Time: 05/12/23 12:31 PM   Result Value Ref Range    Carcinoembryonic Antigen <1.73 0.00 - 3.00 ng/mL   CBC with Differential    Collection Time: 05/12/23 12:31 PM   Result Value Ref Range    WBC 4.88 4.50 - 11.50 x10(3)/mcL    RBC 3.64 (L) 4.20 - 5.40 x10(6)/mcL    Hgb 11.4 (L) 12.0 - 16.0 g/dL    Hct 35.8 (L) 37.0 - 47.0 %    MCV 98.4 (H) 80.0 - 94.0 fL    MCH 31.3 (H) 27.0 - 31.0 pg    MCHC 31.8 (L) 33.0 - 36.0 g/dL    RDW 12.4 11.5 - 17.0 %    Platelet 239 130 - 400 x10(3)/mcL    MPV 9.9 7.4 - 10.4 fL    Neut % 49.0 %    Lymph % 35.0 %    Mono % 11.1 %    Eos % 4.1 %    Basophil % 0.6 %    Lymph # 1.71 0.6 - 4.6 x10(3)/mcL    Neut # 2.39 2.1 - 9.2 x10(3)/mcL    Mono # 0.54 0.1 - 1.3 x10(3)/mcL    Eos # 0.20 0 - 0.9 x10(3)/mcL    Baso # 0.03 <=0.2 x10(3)/mcL    IG# 0.01 0 - 0.04 x10(3)/mcL    IG% 0.2 %        Assessment:   Multifocal stage IIA left breast invasive lobular carcinoma (T2 N0(i) M0) - [facility]  Osteopenia      Plan:   Patient continues to do well with no clinical or laboratory findings suspicious for disease recurrence.  RTC in 6 months for a follow-up visit and clinical exam with repeat laboratory.  Repeat bone density exam in 1 year and if stable consider annual follow-up.      DIONICIO ROMO MD    Other Physicians  Dr. John Miller

## 2023-05-15 LAB — CANCER AG27-29 SERPL-ACNC: <12 U/ML

## 2023-05-18 ENCOUNTER — OFFICE VISIT (OUTPATIENT)
Dept: HEMATOLOGY/ONCOLOGY | Facility: CLINIC | Age: 68
End: 2023-05-18
Payer: MEDICARE

## 2023-05-18 VITALS
RESPIRATION RATE: 16 BRPM | OXYGEN SATURATION: 100 % | SYSTOLIC BLOOD PRESSURE: 123 MMHG | WEIGHT: 127 LBS | HEIGHT: 61 IN | TEMPERATURE: 98 F | HEART RATE: 79 BPM | DIASTOLIC BLOOD PRESSURE: 82 MMHG | BODY MASS INDEX: 23.98 KG/M2

## 2023-05-18 DIAGNOSIS — C50.412 MALIGNANT NEOPLASM OF UPPER-OUTER QUADRANT OF LEFT BREAST IN FEMALE, ESTROGEN RECEPTOR POSITIVE: Primary | ICD-10-CM

## 2023-05-18 DIAGNOSIS — Z17.0 MALIGNANT NEOPLASM OF UPPER-OUTER QUADRANT OF LEFT BREAST IN FEMALE, ESTROGEN RECEPTOR POSITIVE: Primary | ICD-10-CM

## 2023-05-18 PROCEDURE — 99999 PR PBB SHADOW E&M-EST. PATIENT-LVL IV: CPT | Mod: PBBFAC,,, | Performed by: INTERNAL MEDICINE

## 2023-05-18 PROCEDURE — 99214 OFFICE O/P EST MOD 30 MIN: CPT | Mod: S$PBB,,, | Performed by: INTERNAL MEDICINE

## 2023-05-18 PROCEDURE — 99214 OFFICE O/P EST MOD 30 MIN: CPT | Mod: PBBFAC | Performed by: INTERNAL MEDICINE

## 2023-05-18 PROCEDURE — 99999 PR PBB SHADOW E&M-EST. PATIENT-LVL IV: ICD-10-PCS | Mod: PBBFAC,,, | Performed by: INTERNAL MEDICINE

## 2023-05-18 PROCEDURE — 99214 PR OFFICE/OUTPT VISIT, EST, LEVL IV, 30-39 MIN: ICD-10-PCS | Mod: S$PBB,,, | Performed by: INTERNAL MEDICINE

## 2023-05-18 RX ORDER — MELOXICAM 15 MG/1
15 TABLET ORAL
COMMUNITY
Start: 2023-05-01 | End: 2023-12-19

## 2023-06-12 ENCOUNTER — TELEPHONE (OUTPATIENT)
Dept: INTERNAL MEDICINE | Facility: CLINIC | Age: 68
End: 2023-06-12
Payer: MEDICARE

## 2023-06-12 DIAGNOSIS — E78.5 HYPERLIPIDEMIA, UNSPECIFIED HYPERLIPIDEMIA TYPE: ICD-10-CM

## 2023-06-12 DIAGNOSIS — R03.0 ELEVATED BLOOD PRESSURE READING: Primary | ICD-10-CM

## 2023-06-12 DIAGNOSIS — D50.9 IRON DEFICIENCY ANEMIA, UNSPECIFIED IRON DEFICIENCY ANEMIA TYPE: ICD-10-CM

## 2023-06-12 DIAGNOSIS — E55.9 VITAMIN D DEFICIENCY: ICD-10-CM

## 2023-06-12 NOTE — TELEPHONE ENCOUNTER
----- Message from Ajay Correa MA sent at 6/12/2023  8:49 AM CDT -----  Regarding: PV 6/19/23 @ 3:00 Dr. Hills  1. Are there any outstanding tasks in the patient's chart? Yes, fasting labs    2. Is there any documentation in the chart? No    3.Has patient been seen in an ER, Urgent care clinic, or been admitted since last visit?  If yes, When, where, and why    4. Has patient seen any other healthcare providers since last visit?  If yes, when, where, and why    5. Has patient had any bloodwork or XR done since last visit?    6. Is patient signed up for patient portal?

## 2023-06-19 ENCOUNTER — OFFICE VISIT (OUTPATIENT)
Dept: INTERNAL MEDICINE | Facility: CLINIC | Age: 68
End: 2023-06-19
Payer: MEDICARE

## 2023-06-19 VITALS
BODY MASS INDEX: 24.17 KG/M2 | WEIGHT: 128 LBS | TEMPERATURE: 98 F | OXYGEN SATURATION: 98 % | HEIGHT: 61 IN | HEART RATE: 94 BPM | DIASTOLIC BLOOD PRESSURE: 80 MMHG | SYSTOLIC BLOOD PRESSURE: 124 MMHG | RESPIRATION RATE: 16 BRPM

## 2023-06-19 DIAGNOSIS — Z00.00 MEDICARE ANNUAL WELLNESS VISIT, SUBSEQUENT: Primary | ICD-10-CM

## 2023-06-19 DIAGNOSIS — E78.5 HYPERLIPIDEMIA, UNSPECIFIED HYPERLIPIDEMIA TYPE: ICD-10-CM

## 2023-06-19 DIAGNOSIS — K21.9 GERD WITHOUT ESOPHAGITIS: ICD-10-CM

## 2023-06-19 DIAGNOSIS — M85.88 OSTEOPENIA OF SPINE: ICD-10-CM

## 2023-06-19 DIAGNOSIS — C50.412 MALIGNANT NEOPLASM OF UPPER-OUTER QUADRANT OF LEFT BREAST IN FEMALE, ESTROGEN RECEPTOR POSITIVE: ICD-10-CM

## 2023-06-19 DIAGNOSIS — M17.11 PRIMARY OSTEOARTHRITIS OF RIGHT KNEE: ICD-10-CM

## 2023-06-19 DIAGNOSIS — Z17.0 MALIGNANT NEOPLASM OF UPPER-OUTER QUADRANT OF LEFT BREAST IN FEMALE, ESTROGEN RECEPTOR POSITIVE: ICD-10-CM

## 2023-06-19 PROCEDURE — G0439 PPPS, SUBSEQ VISIT: HCPCS | Mod: ,,, | Performed by: INTERNAL MEDICINE

## 2023-06-19 PROCEDURE — G0439 PR MEDICARE ANNUAL WELLNESS SUBSEQUENT VISIT: ICD-10-PCS | Mod: ,,, | Performed by: INTERNAL MEDICINE

## 2023-06-19 RX ORDER — DOCUSATE SODIUM 100 MG/1
100 CAPSULE, LIQUID FILLED ORAL DAILY
COMMUNITY
End: 2023-12-19

## 2023-06-19 RX ORDER — GABAPENTIN 100 MG/1
100 CAPSULE ORAL 3 TIMES DAILY
Qty: 30 CAPSULE | Refills: 0 | Status: SHIPPED | OUTPATIENT
Start: 2023-06-19 | End: 2023-12-19

## 2023-06-19 NOTE — PROGRESS NOTES
Patient ID: [id number]     Chief Complaint: Medicare AWV      HPI:     Ann-Marie Perea is a 68 y.o. female here today for a Medicare Wellness.   Stage 2 invasive lobular carcinoma status post bilateral radical mastectomy with Morris See; reconstruction performed by Dr. iMller  Oncologist is Dr. Olivera   She has ER/ [place] positive disease currently on anastrozole. Her daughter who is 41 was recently diagnosed with breast cancer, ductal type, hormone receptor positive. She is recently off her 5 year pill     Bone density exam 22 showed progression of osteopenia of the lumbar spine with a T score of -2.0 (previous -1.3) and stable osteopenia of both hips with a T score of -2.2 on the right and -1.5 on the left.  She does not wish to move for with treatment right now she will discuss with Dr. Olivera at her next visit in October, may be consideration for Evista?    GYN Dr. Duarte.  Mom with history of CVA in her 60s; from her carotids, atrial fib;  at 87 from heart failure  Dad with CABG in his 80s; alive at 91  [facility] for cards; calcium score 40; LDL at 82 -4-  Jaydon recently did knee replacement in April, still with pain to the right knee      Opioid Screening: Patient medication list reviewed, patient is not taking prescription opioids. Patient is not using additional opioids than prescribed. Patient is at low risk of substance abuse based on this opioid use history.       ----------------------------  Impingement syndrome of right shoulder  Malignant neoplasm of upper-outer quadrant of left female breast  Osteopenia  RLS (restless legs syndrome)  Venous insufficiency     Past Surgical History:   Procedure Laterality Date    BILATERAL MASTECTOMY  2017    BREAST BIOPSY Left     BREAST SURGERY  2017    CARPAL TUNNEL RELEASE  2019    CERVICAL FUSION  2020    COLONOSCOPY  2019    ENDOMETRIAL ABLATION      KNEE SURGERY      SPINE SURGERY  20       Review of patient's  allergies indicates:  No Known Allergies    Outpatient Medications Marked as Taking for the 6/19/23 encounter (Office Visit) with John Egan MD   Medication Sig Dispense Refill    B. animalis-vitamin D3 1 billion cell- 10 mcg/5 drops Drop Take 2 drops by mouth Daily.      calcium-vitamin D 250 mg-2.5 mcg (100 unit) per tablet Take 1 tablet by mouth once daily.      co-enzyme Q-10 30 mg capsule Take 100 mg by mouth once daily.      cranberry fruit concentrate (AZO CRANBERRY ORAL) Take 1 tablet by mouth once daily.      docusate sodium (COLACE) 100 MG capsule Take 100 mg by mouth Daily.      eszopiclone (LUNESTA) 2 MG Tab Take 1 tablet (2 mg total) by mouth every evening. 30 tablet 2    fexofenadine (ALLEGRA) 180 MG tablet Take 180 mg by mouth nightly.      fluticasone propionate (FLONASE) 50 mcg/actuation nasal spray 1 spray by Each Nostril route once daily.      glucosamine-chondroitin 500-400 mg tablet Take 2 tablets by mouth once daily.      loratadine (CLARITIN) 10 mg tablet Take 10 mg by mouth nightly.      meloxicam (MOBIC) 15 MG tablet Take 15 mg by mouth.      multivit/folic acid/vit K1 (ONE-A-DAY WOMEN'S 50 PLUS ORAL) Take 1 tablet by mouth once daily.      omega-3 fatty acids/fish oil (FISH OIL-OMEGA-3 FATTY ACIDS) 300-1,000 mg capsule Take 1 capsule by mouth once daily.      pantoprazole (PROTONIX) 40 MG tablet TAKE 1 TABLET BY MOUTH TWICE A DAY FOR A WEEK THEN DAILY THEREAFTER 90 tablet 4    rosuvastatin (CRESTOR) 10 MG tablet TAKE 1 TABLET BY MOUTH EVERY DAY 90 tablet 4    sodium bicarb-sodium chloride (NASAMIST) 2.7 % SprA 2 sprays by Nasal route nightly.      UNABLE TO FIND 2 drops Daily. medication name: Hi-Po Emulsi - D3      UNABLE TO FIND Collagen Peptides 1 scoop daily      UNABLE TO FIND 3 capsules Daily. Balance of Nature (Fruits and Veggies)         Social History     Socioeconomic History    Marital status:    Occupational History    Occupation:  for 's law  firm   Tobacco Use    Smoking status: Former     Packs/day: 1.00     Types: Cigarettes    Smokeless tobacco: Never    Tobacco comments:     Quit over 40 years ago   Substance and Sexual Activity    Alcohol use: Yes     Alcohol/week: 7.0 - 14.0 standard drinks     Types: 7 - 14 Glasses of wine per week    Drug use: Never    Sexual activity: Yes     Partners: Male     Birth control/protection: None     Social Determinants of Health     Financial Resource Strain: Low Risk     Difficulty of Paying Living Expenses: Not hard at all   Food Insecurity: No Food Insecurity    Worried About Running Out of Food in the Last Year: Never true    Ran Out of Food in the Last Year: Never true   Transportation Needs: No Transportation Needs    Lack of Transportation (Medical): No    Lack of Transportation (Non-Medical): No   Physical Activity: Sufficiently Active    Days of Exercise per Week: 5 days    Minutes of Exercise per Session: 60 min   Stress: No Stress Concern Present    Feeling of Stress : Not at all   Social Connections: Moderately Integrated    Frequency of Communication with Friends and Family: More than three times a week    Frequency of Social Gatherings with Friends and Family: More than three times a week    Attends Advent Services: More than 4 times per year    Active Member of Clubs or Organizations: No    Attends Club or Organization Meetings: Never    Marital Status:    Housing Stability: Low Risk     Unable to Pay for Housing in the Last Year: No    Number of Places Lived in the Last Year: 1    Unstable Housing in the Last Year: No        Family History   Problem Relation Age of Onset    Stroke Mother     Breast cancer Daughter 41    Breast cancer Maternal Cousin         Patient Care Team:  John Egan MD as PCP - General (Internal Medicine)       Subjective:     ROS  Constitutional: No fever, no chills, no night sweats, no changes in weight, no changes in appetite.  Eye: No blurring of  vision, no double vision, no conjunctival injection, no acute vision loss  ENMT: No trauma, No sore throat, no rhinorrhea, no tinnitus, no headache, no vertigo, no ear pain or discharge  Respiratory: No cough, no sputum production, no shortness of breath, no hemoptysis, no wheezing.  Cardiovascular: No chest pain, no VILLARREAL, no PND, no orthopnea, no edema, no palpitations.  Gastrointestinal: No abdominal pain, nausea, no vomiting, no changes in frequency or consistency of stools, no diarrhea, no constipation, no BRBPR.  Genitourinary: No dysuria, no hematuria, no foul-smelling urine, no straining to urinate, no increase in frequency  Heme/Lymph: No easy bruising/ bleeding, no swollen or painful glands.  Endocrine: No polyuria, no polydipsia, no polyphagia, no heat or cold intolerance.  Musculoskeletal: No muscle pain, no muscle weakness, + joint pain, no difficulties on reference to range of motion.  Integumentary: No rash, no pruritis.  Neurologic: No sensory deficit, no motor deficit, no headache, no neck rigidity, no paresthesias, no syncope.  Psychiatric: no mood changes, no anxiety, no depression, no tension, no memory defecits  All Other ROS: Negative with exception of what is documented in the history of present illness     Patient Reported Health Risk Assessment  What [age] your [age]?: 65-69  Are you male or female?: Female  During the past four weeks, how much have you been bothered by emotional problems such as feeling anxious, depressed, irritable, sad, or downhearted and blue?: Not at all  During the past five weeks, has your physical and/or emotional health limited your social activities with family, friends, neighbors, or groups?: Not at all  During the past four weeks, how much bodily pain have you generally had?: Very mild pain  During the past four weeks, was someone available to help if you needed and wanted help?: Yes, as much as I wanted  During the past four weeks, what was the hardest physical  activity you could do for at least two minutes?: Heavy  Can you get to places out of walking distance without help?  (For example, can you travel alone on buses or taxis, or drive your own car?): Yes  Can you go shopping for groceries or clothes without someone's help?: Yes  Can you prepare your own meals?: Yes  Can you do your own housework without help?: Yes  Because of any health problems, do you need the help of another person with your personal care needs such as eating, bathing, dressing, or getting around the house?: No  Can you handle your own money without help?: Yes  During the past four weeks, how would you rate your health in general?: Very good  How have things been going for you during the past four weeks?: Pretty well  Are you having difficulties driving your car?: No  Do you always fasten your seat belt when you are in a car?: Yes, usually  How often in the past four weeks have you been bothered by falling or dizzy when standing up?: Never  How often in the past four weeks have you been bothered by sexual problems?: Never  How often in the past four weeks have you been bothered by trouble eating well?: Never  How often in the past four weeks have you been bothered by teeth or denture problems?: Never  How often in the past four weeks have you been bothered with problems using the telephone?: Never  How often in the past four weeks have you been bothered by tiredness or fatigue?: Never  Have you fallen two or more times in the past year?: No  Are you afraid of falling?: No  Are you a smoker?: No  During the past four weeks, how many drinks of wine, beer, or other alcoholic beverages did you have?: One drink or less per week  Do you exercise for about 20 minutes three or more days a week?: Yes, most of the time  Have you been given any information to help you with hazards in your house that might hurt you?: Yes  Have you been given any information to help you with keeping track of your medications?:  "Yes  How often do you have trouble taking medicines the way you've been told to take them?: I always take them as prescribed  How confident are you that you can control and manage most of your health problems?: Very confident  What is your race? (Check all that apply.):     Objective:     /80 (BP Location: Right arm, Patient Position: Sitting, BP Method: Medium (Manual))   Pulse 94   Temp 97.5 °F (36.4 °C) (Temporal)   Resp 16   Ht 5' 1" (1.549 m)   Wt 58.1 kg (128 lb)   SpO2 98%   BMI 24.19 kg/m²     Physical Exam  General : Alert and oriented, No acute distress, afebrile.  Eye : PERRLA. EOMI. Normal conjunctiva, Sclerae are nonicteric. No conjunctival injection, no pallor.  HEENT : Normocephalic/ atraumatic, Normal hearing  Respiratory : Respirations are non-labored and clear to auscultation bilaterally. Symmetrical air entry bilaterally, no crackles, no wheezes, no rhonchi. No cyanosis, no clubbing.  Cardiovascular : Normal rate, Regular rhythm. No murmurs, rubs, or gallops. Pulses are 2+ throughout. No JVD. No Edema.  Gastrointestinal : Soft, nontender, non-distended, bowel sounds are present in all quadrants, no organomegaly, no guarding, no rebound.  Musculoskeletal : Normal range of motion throughout. No muscle tenderness.  Integumentary : Warm, moist, intact.  Neurologic : Alert, Oriented, no FND  Psychiatric : Cooperative, Appropriate mood & affect.     Checklist of Activities of Daily Living 6/15/2022   Bathing Independent   Dressing Independent   Grooming Independent   Oral Care Independent   Toileting Independent   Transferring Independent   Walking Independent   Climbing Stairs Independent   Eating Independent   Shopping Independent   Cooking Independent   Managing Medications Independent   Using the Phone Independent   Housework Indpendent   Laundry Independent   Driving Independent   Managing Finances Independent     Fall Risk Assessment - Outpatient 6/19/2023 4/4/2023 11/17/2022 " 10/19/2022 10/5/2022 6/15/2022   Mobility Status Ambulatory Ambulatory Ambulatory Ambulatory Ambulatory Ambulatory   Number of falls 0 0 0 0 0 0   Identified as fall risk 0 0 0 0 0 0           Depression Screening  Over the past two weeks, has the patient felt down, depressed, or hopeless?: No  Over the past two weeks, has the patient felt little interest or pleasure in doing things?: No  Functional Ability/Safety Screening  Was the patient's timed Up & Go test unsteady or longer than 30 seconds?: No  Does the patient need help with phone, transportation, shopping, preparing meals, housework, laundry, meds, or managing money?: No  Does the patient's home have rugs in the hallway, lack grab bars in the bathroom, lack handrails on the stairs or have poor lighting?: No  Have you noticed any hearing difficulties?: No  Cognitive Function (Assessed through direct observation with due consideration of information obtained by way of patient reports and/or concerns raised by family, friends, caretakers, or others)    Does the patient repeat questions/statements in the same day?: No  Does the patient have trouble remembering the date, year, and time?: No  Does the patient have difficulty managing finances?: No  Does the patient have a decreased sense of direction?: No  Assessment/Plan:     1. Medicare annual wellness visit, subsequent  Assessment & Plan:  General health maint education given  Labs reviewed  DEXA in 2024      2. GERD without esophagitis  Assessment & Plan:  Takes Protonix on MWF      3. Hyperlipidemia, unspecified hyperlipidemia type  Assessment & Plan:  Lab Results   Component Value Date    LDL 82.00 04/28/2022    TRIG 78 04/28/2022    HDL 83 04/28/2022    TOTALCHOLEST 2 04/28/2022     Continue  Stressed importance of dietary modifications. Follow a low cholesterol, low saturated fat diet with less that 200mg of cholesterol a day.  Avoid fried foods and high saturated fats (high saturated fats less than 7% of  calories).  Add Flax Seed/Fish Oil supplements to diet. Increase dietary fiber.  Regular exercise can reduce LDL and raise HDL. Stressed importance of physical activity 5 times per week for 30 minutes per day.     Orders:  -     Lipid Panel; Future; Expected date: 12/19/2023  -     Comprehensive Metabolic Panel; Future; Expected date: 12/19/2023    4. Primary osteoarthritis of right knee  Assessment & Plan:  Post op pain continues, will try a little low dose of gabapentin, no improvement with Mobic      5. Osteopenia of spine  Assessment & Plan:  DEXA 2024      6. Malignant neoplasm of upper-outer quadrant of left breast in female, estrogen receptor positive  Assessment & Plan:  Recently taken off of hormone blockers  Has completed 5 years  DEXA next year, hope for improvement now that she is off of hormone blockade      Other orders  -     gabapentin (NEURONTIN) 100 MG capsule; Take 1 capsule (100 mg total) by mouth 3 (three) times daily.  Dispense: 30 capsule; Refill: 0           Medicare Annual Wellness and Personalized Prevention Plan:   Fall Risk + Home Safety + Hearing Impairment + Depression Screen + Opioid and Substance Abuse Screening + Cognitive Impairment Screen + Health Risk Assessment all reviewed.     Health Maintenance Topics with due status: Not Due       Topic Last Completion Date    TETANUS VACCINE 08/23/2016    Colorectal Cancer Screening 06/27/2019    DEXA Scan 04/11/2022    Lipid Panel 03/24/2023    Influenza Vaccine Not Due      The patient's Health Maintenance was reviewed and the following appears to be due at this time:   Health Maintenance Due   Topic Date Due    Hepatitis C Screening  Never done    Shingles Vaccine (2 of 2) 05/18/2021    COVID-19 Vaccine (3 - Moderna series) 10/07/2021       Advance Care Planning   I attest to discussing Advance Care Planning with patient and/or family member.  Education was provided including the importance of the Health Care Power of , Advance  Directives, and/or [place] documentation.  The patient expressed understanding to the importance of this information and discussion.         Follow up in about 6 months (around 12/19/2023) for with labs prior to visit, NURSE PRACTITIONER. In addition to their scheduled follow up, the patient has also been instructed to follow up on as needed basis.

## 2023-06-19 NOTE — ASSESSMENT & PLAN NOTE
Lab Results   Component Value Date    LDL 82.00 04/28/2022    TRIG 78 04/28/2022    HDL 83 04/28/2022    TOTALCHOLEST 2 04/28/2022     Continue  Stressed importance of dietary modifications. Follow a low cholesterol, low saturated fat diet with less that 200mg of cholesterol a day.  Avoid fried foods and high saturated fats (high saturated fats less than 7% of calories).  Add Flax Seed/Fish Oil supplements to diet. Increase dietary fiber.  Regular exercise can reduce LDL and raise HDL. Stressed importance of physical activity 5 times per week for 30 minutes per day.

## 2023-06-20 NOTE — ASSESSMENT & PLAN NOTE
Recently taken off of hormone blockers  Has completed 5 years  DEXA next year, hope for improvement now that she is off of hormone blockade

## 2023-07-19 DIAGNOSIS — K21.9 GERD WITHOUT ESOPHAGITIS: ICD-10-CM

## 2023-07-19 RX ORDER — PANTOPRAZOLE SODIUM 40 MG/1
40 TABLET, DELAYED RELEASE ORAL DAILY
Qty: 90 TABLET | Refills: 1 | Status: SHIPPED | OUTPATIENT
Start: 2023-07-19

## 2023-08-07 DIAGNOSIS — G47.00 INSOMNIA, UNSPECIFIED TYPE: ICD-10-CM

## 2023-08-07 RX ORDER — ESZOPICLONE 2 MG/1
2 TABLET, FILM COATED ORAL NIGHTLY
Qty: 30 TABLET | Refills: 2 | Status: SHIPPED | OUTPATIENT
Start: 2023-08-07 | End: 2023-08-10 | Stop reason: SDUPTHER

## 2023-08-07 NOTE — TELEPHONE ENCOUNTER
----- Message from Junior Meza sent at 8/7/2023 10:24 AM CDT -----  Eszopiclone 2 mg tab   Qty 30   Super [facility] - Aparna Galvez

## 2023-08-10 DIAGNOSIS — G47.00 INSOMNIA, UNSPECIFIED TYPE: ICD-10-CM

## 2023-08-10 RX ORDER — ESZOPICLONE 2 MG/1
2 TABLET, FILM COATED ORAL NIGHTLY
Qty: 30 TABLET | Refills: 2 | Status: SHIPPED | OUTPATIENT
Start: 2023-08-10 | End: 2023-09-06 | Stop reason: SDUPTHER

## 2023-09-06 DIAGNOSIS — G47.00 INSOMNIA, UNSPECIFIED TYPE: ICD-10-CM

## 2023-09-06 RX ORDER — ESZOPICLONE 2 MG/1
2 TABLET, FILM COATED ORAL NIGHTLY
Qty: 30 TABLET | Refills: 2 | Status: SHIPPED | OUTPATIENT
Start: 2023-09-06 | End: 2023-09-07 | Stop reason: SDUPTHER

## 2023-09-07 RX ORDER — ESZOPICLONE 2 MG/1
2 TABLET, FILM COATED ORAL NIGHTLY
Qty: 7 TABLET | Refills: 0 | Status: SHIPPED | OUTPATIENT
Start: 2023-09-07 | End: 2023-09-14

## 2023-09-07 NOTE — TELEPHONE ENCOUNTER
LOV: 6/19/23  NOV: 12/19/23  Last fill: none  Last sent: Yesterday to [facility] in [place].     Need to resend to correct pharmacy.

## 2023-09-07 NOTE — TELEPHONE ENCOUNTER
----- Message from Mercedes Reynoso sent at 9/7/2023  1:58 PM CDT -----  Regarding: Refill Request  .Type:  RX Refill Request    Who Called: pt  Refill or New Rx:refill   RX Name and Strength:eszopiclone (LUNESTA) 2 MG Tab  How is the patient currently taking it? (ex. 1XDay):1x  Is this a 30 day or 90 day RX:30  Preferred Pharmacy with phone number: [facility] in [place]   Local or Mail Order:local   Ordering Provider:[facility] [place]   Would the patient rather a call back or a response via [facility]?   Best Call Back Number:[phone]  Additional Information: pt states request to have this prescription rerouted to [facility] [place] [facility]

## 2023-09-18 PROBLEM — Z00.00 MEDICARE ANNUAL WELLNESS VISIT, SUBSEQUENT: Status: RESOLVED | Noted: 2022-06-15 | Resolved: 2023-09-18

## 2023-09-23 DIAGNOSIS — E78.5 HYPERLIPIDEMIA, UNSPECIFIED HYPERLIPIDEMIA TYPE: Primary | ICD-10-CM

## 2023-09-25 RX ORDER — ROSUVASTATIN CALCIUM 10 MG/1
TABLET, COATED ORAL
Qty: 90 TABLET | Refills: 4 | Status: SHIPPED | OUTPATIENT
Start: 2023-09-25

## 2023-11-09 ENCOUNTER — LAB VISIT (OUTPATIENT)
Dept: LAB | Facility: HOSPITAL | Age: 68
End: 2023-11-09
Attending: INTERNAL MEDICINE
Payer: MEDICARE

## 2023-11-09 DIAGNOSIS — C50.412 MALIGNANT NEOPLASM OF UPPER-OUTER QUADRANT OF LEFT BREAST IN FEMALE, ESTROGEN RECEPTOR POSITIVE: ICD-10-CM

## 2023-11-09 DIAGNOSIS — Z17.0 MALIGNANT NEOPLASM OF UPPER-OUTER QUADRANT OF LEFT BREAST IN FEMALE, ESTROGEN RECEPTOR POSITIVE: ICD-10-CM

## 2023-11-09 LAB
ALBUMIN SERPL-MCNC: 4.4 G/DL (ref 3.4–4.8)
ALBUMIN/GLOB SERPL: 1.5 RATIO (ref 1.1–2)
ALP SERPL-CCNC: 68 UNIT/L (ref 40–150)
ALT SERPL-CCNC: 22 UNIT/L (ref 0–55)
AST SERPL-CCNC: 26 UNIT/L (ref 5–34)
BASOPHILS # BLD AUTO: 0.04 X10(3)/MCL
BASOPHILS NFR BLD AUTO: 1 %
BILIRUB SERPL-MCNC: 0.5 MG/DL
BUN SERPL-MCNC: 14.8 MG/DL (ref 9.8–20.1)
CALCIUM SERPL-MCNC: 10.4 MG/DL (ref 8.4–10.2)
CEA SERPL-MCNC: <1.73 NG/ML (ref 0–3)
CHLORIDE SERPL-SCNC: 103 MMOL/L (ref 98–107)
CO2 SERPL-SCNC: 31 MMOL/L (ref 23–31)
CREAT SERPL-MCNC: 0.84 MG/DL (ref 0.55–1.02)
EOSINOPHIL # BLD AUTO: 0.13 X10(3)/MCL (ref 0–0.9)
EOSINOPHIL NFR BLD AUTO: 3.2 %
ERYTHROCYTE [DISTWIDTH] IN BLOOD BY AUTOMATED COUNT: 12.4 % (ref 11.5–17)
GFR SERPLBLD CREATININE-BSD FMLA CKD-EPI: >60 MLS/MIN/1.73/M2
GLOBULIN SER-MCNC: 2.9 GM/DL (ref 2.4–3.5)
GLUCOSE SERPL-MCNC: 81 MG/DL (ref 82–115)
HCT VFR BLD AUTO: 42.2 % (ref 37–47)
HGB BLD-MCNC: 13.4 G/DL (ref 12–16)
IMM GRANULOCYTES # BLD AUTO: 0.01 X10(3)/MCL (ref 0–0.04)
IMM GRANULOCYTES NFR BLD AUTO: 0.2 %
LYMPHOCYTES # BLD AUTO: 1 X10(3)/MCL (ref 0.6–4.6)
LYMPHOCYTES NFR BLD AUTO: 24.5 %
MCH RBC QN AUTO: 31 PG (ref 27–31)
MCHC RBC AUTO-ENTMCNC: 31.8 G/DL (ref 33–36)
MCV RBC AUTO: 97.7 FL (ref 80–94)
MONOCYTES # BLD AUTO: 0.46 X10(3)/MCL (ref 0.1–1.3)
MONOCYTES NFR BLD AUTO: 11.3 %
NEUTROPHILS # BLD AUTO: 2.44 X10(3)/MCL (ref 2.1–9.2)
NEUTROPHILS NFR BLD AUTO: 59.8 %
PLATELET # BLD AUTO: 248 X10(3)/MCL (ref 130–400)
PMV BLD AUTO: 9.8 FL (ref 7.4–10.4)
POTASSIUM SERPL-SCNC: 4 MMOL/L (ref 3.5–5.1)
PROT SERPL-MCNC: 7.3 GM/DL (ref 5.8–7.6)
RBC # BLD AUTO: 4.32 X10(6)/MCL (ref 4.2–5.4)
SODIUM SERPL-SCNC: 140 MMOL/L (ref 136–145)
WBC # SPEC AUTO: 4.08 X10(3)/MCL (ref 4.5–11.5)

## 2023-11-09 PROCEDURE — 82378 CARCINOEMBRYONIC ANTIGEN: CPT

## 2023-11-09 PROCEDURE — 85025 COMPLETE CBC W/AUTO DIFF WBC: CPT

## 2023-11-09 PROCEDURE — 80053 COMPREHEN METABOLIC PANEL: CPT

## 2023-11-09 PROCEDURE — 86300 IMMUNOASSAY TUMOR CA 15-3: CPT

## 2023-11-09 PROCEDURE — 36415 COLL VENOUS BLD VENIPUNCTURE: CPT

## 2023-11-09 NOTE — PROGRESS NOTES
Subjective:       Patient ID: Ann-Marie Perea is a 68 y.o. female.    Chief Complaint:  I am doing well    Diagnosis: Multifocal stage IIA left breast invasive lobular carcinoma (T2 N0(i+) M0), 2.9/2.1 cm, GII, 1 IHC+ SN,                        ER/AR +, HER-2 negative, Oncotype RS 8                    S/p bilateral mastectomies                    Postmenopausal with intact uterus    Treatment History: Arimidex 6/17-12/22    Current Treatment: Observation    Clinical History:  Patient initially presented 3/17 with mild left nipple retraction on self examination. Her last mammogram had been 11/14. She had a history of a benign breast biopsy on the left side several years earlier. She was referred for a mammogram 3/21/17 showing 3 suspicious lesions in the left breast. 2 of these lesions were biopsied and positive for invasive lobular carcinoma. CT PET scan 4/3/17 showed mildly increased hypermetabolic activity in the left breast compared to the right but no other abnormal activity to suggest metastatic disease. She underwent bilateral mastectomies with placement of tissue expanders and left sentinel lymph node dissection 4/17/17. Right breast showed atypical lobular hyperplasia and sclerosing adenosis. Left breast showed 2 foci of grade II invasive lobular carcinoma measuring 2.9 cm at 3:00 and 2.1 cm at 8:00. [place] lymph node #1 showed isolated tumor cells by IHC. [place] lymph nodes # 2 and 3 were negative and 3 additional axillary nodes were also negative. ER +96%, AR +94% and HER-2 negative by FISH. She was seen postoperatively by Dr. Lee. Oncotype testing showed a low risk recurrence score of 8 which was equivalent to a 6% 10 year risk of recurrence in Tamoxifen treated patients. Adjuvant hormonal therapy was recommended with an aromatase inhibitor for 5-10 years. Baseline bone density exam 6/9/17 showed osteopenia of the lumbar spine with a T score of -1.8 and both hips with T-scores of -1.5 on the  left and -1.1 on the right. There was osteoporosis of the left femoral neck at -2.6. She was started on a calcium and vitamin D supplement. Treatment was discussed with Prolia but she had significant concerns regarding the side effect profile. Her postoperative course was complicated by development of an infected left tissue expander requiring removal and antibiotic therapy. She eventually required a left latissimus flap and was able to have her expander replaced.    [facility] to establish ongoing Oncology care due to the detention of Dr. Rivero. She was on Arimidex 1 mg daily. She still had bilateral tissue expanders in place. Her daughter had been diagnosed with breast cancer at the age of 41 in [place]. The patient tested negative for BRCA1 and BRCA2 mutations. When she was seen for follow-up 7/19/18, she complained of discomfort and thickening in the left axilla. She had no palpable masses on her clinical exam. Ultrasound of the left breast and axilla 7/25/18 showed benign appearing fibrofatty tissue with no masses, cyst or other abnormalities.  She completed breast reconstruction with implant placement and bilateral nipple tattooing.  Arimidex therapy was discontinued 12/22 after 5-1/2 years of treatment.    Interval History  She returns to clinic today by herself for a 6 month surveillance visit.  She feels well.  She reports no recent illnesses or problems.  No medication changes.  She reports no changes on her self-breast examination.  She has no signs or symptoms suspicious for disease recurrence.  Laboratory testing for this visit showed no suspicious abnormalities.  Bone density exam 4/11/22 showed osteopenia of the lumbar spine with a T-score of -2.0 (previous -1.3) and stable osteopenia of both hips with a T-score of -2.2 on the right and -1.5 on the left.    Review of Systems   Constitutional:  Negative for appetite change, fatigue and fever.   HENT:   "Negative for mouth sores, sore throat and trouble swallowing.    Eyes: Negative.    Respiratory:  Negative for cough and shortness of breath.    Cardiovascular:  Negative for chest pain, palpitations and leg swelling.   Gastrointestinal:  Negative for abdominal distention, abdominal pain, constipation, diarrhea, nausea and vomiting.   Genitourinary:  Negative for dysuria, frequency and urgency.   Musculoskeletal:  Positive for arthralgias. Negative for back pain.   Integumentary:  Negative for pallor and rash.   Neurological:  Negative for dizziness, weakness, numbness and headaches.   Hematological:  Negative for adenopathy. Does not bruise/bleed easily.   Psychiatric/Behavioral: Negative.         PMHx:  Osteopenia, venous insufficiency, restless legs  Menarche age 13, first pregnancy 21, 3 children (+ breast fed), menopause early 50s, no HRT.  PSHx:  Bilateral mastectomies 4/17, endometrial ablation, left breast biopsy, cervical fusion 12/20, right TKR 4/23  SH:  Former smoker 1 PPD, quit 1980s. + Social alcohol use. Lives in [place] with her . Works as a  for her [employer].  FH:  Her daughter was diagnosed with breast cancer age 41. A maternal first cousin also had breast cancer.     Objective:        /73   Pulse 80   Temp 97.9 °F (36.6 °C)   Resp 14   Ht 5' 1" (1.549 m)   Wt 62 kg (136 lb 11.2 oz)   SpO2 98%   BMI 25.83 kg/m²    Physical Exam  Constitutional:       Comments: Well-developed white female in NAD   HENT:      Head: Normocephalic.      Mouth/Throat:      Mouth: Mucous membranes are moist.      Pharynx: Oropharynx is clear. No posterior oropharyngeal erythema.   Eyes:      Extraocular Movements: Extraocular movements intact.      Conjunctiva/sclera: Conjunctivae normal.      Pupils: Pupils are equal, round, and reactive to light.   Neck:      Comments: Well-healed incision left anterior neck.  Cardiovascular:      Rate and Rhythm: Normal rate and regular " rhythm.      Heart sounds: No murmur heard.  Pulmonary:      Comments: Lungs clear to auscultation.  Chest:      Comments: Bilateral mastectomies with implant reconstruction and left latissimus dorsi flap.  No suspicious masses, skin changes or axillary nodes bilaterally.  Abdominal:      General: Bowel sounds are normal. There is no distension.      Palpations: Abdomen is soft. There is no mass.      Tenderness: There is no abdominal tenderness.   Musculoskeletal:         General: No swelling or tenderness. Normal range of motion.      Cervical back: Neck supple. No tenderness.      Comments: Well-healed right knee incision   Lymphadenopathy:      Cervical: No cervical adenopathy.   Skin:     General: Skin is warm and dry.      Findings: No rash.   Neurological:      General: No focal deficit present.      Mental Status: She is alert and oriented to person, place, and time.      Cranial Nerves: No cranial nerve deficit.      Motor: No weakness.       ECOG SCORE    0 - Fully active-able to carry on all pre-disease performance without restriction          LABORATORY  No results found for this or any previous visit (from the past 168 hour(s)).       Assessment:   Multifocal stage IIA left breast invasive lobular carcinoma (T2 N0(i) M0) - [place]  Osteopenia      Plan:   Patient continues to do well with no clinical or laboratory findings suspicious for disease recurrence.  RTC in 6 months for a follow-up visit and clinical exam with repeat laboratory and follow-up bone density exam.      DIONICIO ROMO MD    Other Physicians  Dr. John Miller

## 2023-11-13 LAB — CANCER AG27-29 SERPL-ACNC: <12 U/ML

## 2023-11-16 ENCOUNTER — OFFICE VISIT (OUTPATIENT)
Dept: HEMATOLOGY/ONCOLOGY | Facility: CLINIC | Age: 68
End: 2023-11-16
Payer: MEDICARE

## 2023-11-16 VITALS
WEIGHT: 136.69 LBS | DIASTOLIC BLOOD PRESSURE: 73 MMHG | TEMPERATURE: 98 F | OXYGEN SATURATION: 98 % | HEART RATE: 80 BPM | SYSTOLIC BLOOD PRESSURE: 113 MMHG | RESPIRATION RATE: 14 BRPM | HEIGHT: 61 IN | BODY MASS INDEX: 25.81 KG/M2

## 2023-11-16 DIAGNOSIS — M85.80 OSTEOPENIA AFTER MENOPAUSE: ICD-10-CM

## 2023-11-16 DIAGNOSIS — C50.412 MALIGNANT NEOPLASM OF UPPER-OUTER QUADRANT OF LEFT BREAST IN FEMALE, ESTROGEN RECEPTOR POSITIVE: Primary | ICD-10-CM

## 2023-11-16 DIAGNOSIS — Z78.0 OSTEOPENIA AFTER MENOPAUSE: ICD-10-CM

## 2023-11-16 DIAGNOSIS — Z17.0 MALIGNANT NEOPLASM OF UPPER-OUTER QUADRANT OF LEFT BREAST IN FEMALE, ESTROGEN RECEPTOR POSITIVE: Primary | ICD-10-CM

## 2023-11-16 PROCEDURE — 99214 PR OFFICE/OUTPT VISIT, EST, LEVL IV, 30-39 MIN: ICD-10-PCS | Mod: S$PBB,,, | Performed by: INTERNAL MEDICINE

## 2023-11-16 PROCEDURE — 99214 OFFICE O/P EST MOD 30 MIN: CPT | Mod: S$PBB,,, | Performed by: INTERNAL MEDICINE

## 2023-11-16 PROCEDURE — 99999 PR PBB SHADOW E&M-EST. PATIENT-LVL V: ICD-10-PCS | Mod: PBBFAC,,, | Performed by: INTERNAL MEDICINE

## 2023-11-16 PROCEDURE — 99215 OFFICE O/P EST HI 40 MIN: CPT | Mod: PBBFAC | Performed by: INTERNAL MEDICINE

## 2023-11-16 PROCEDURE — 99999 PR PBB SHADOW E&M-EST. PATIENT-LVL V: CPT | Mod: PBBFAC,,, | Performed by: INTERNAL MEDICINE

## 2023-12-05 ENCOUNTER — TELEPHONE (OUTPATIENT)
Dept: INTERNAL MEDICINE | Facility: CLINIC | Age: 68
End: 2023-12-05
Payer: MEDICARE

## 2023-12-05 NOTE — TELEPHONE ENCOUNTER
----- Message from Ajay Correa MA sent at 12/5/2023  2:17 PM CST -----  Regarding: PV 12/19/23 @ 2:20 Dr. Hills  1. Are there any outstanding tasks in the patient's chart? Yes, fasting labs    2. Is there any documentation in the chart? No    3.Has patient been seen in an ER, Urgent care clinic, or been admitted since last visit?  If yes, When, where, and why    4. Has patient seen any other healthcare providers since last visit?  If yes, when, where, and why    5. Has patient had any bloodwork or XR done since last visit?    6. Is patient signed up for patient portal?

## 2023-12-13 ENCOUNTER — LAB VISIT (OUTPATIENT)
Dept: LAB | Facility: HOSPITAL | Age: 68
End: 2023-12-13
Attending: INTERNAL MEDICINE
Payer: MEDICARE

## 2023-12-13 DIAGNOSIS — E55.9 VITAMIN D DEFICIENCY: ICD-10-CM

## 2023-12-13 DIAGNOSIS — D50.9 IRON DEFICIENCY ANEMIA, UNSPECIFIED IRON DEFICIENCY ANEMIA TYPE: ICD-10-CM

## 2023-12-13 DIAGNOSIS — R03.0 ELEVATED BLOOD PRESSURE READING: ICD-10-CM

## 2023-12-13 DIAGNOSIS — E78.5 HYPERLIPIDEMIA, UNSPECIFIED HYPERLIPIDEMIA TYPE: ICD-10-CM

## 2023-12-13 DIAGNOSIS — D68.32 INCREASE IN EXTRINSIC ANTICOAGULANT: ICD-10-CM

## 2023-12-13 DIAGNOSIS — Z01.818 PRE-OP EXAMINATION: ICD-10-CM

## 2023-12-13 LAB
ALBUMIN SERPL-MCNC: 4.3 G/DL (ref 3.4–4.8)
ALBUMIN/GLOB SERPL: 1.6 RATIO (ref 1.1–2)
ALP SERPL-CCNC: 68 UNIT/L (ref 40–150)
ALT SERPL-CCNC: 18 UNIT/L (ref 0–55)
APPEARANCE UR: CLEAR
AST SERPL-CCNC: 26 UNIT/L (ref 5–34)
BACTERIA #/AREA URNS AUTO: ABNORMAL /HPF
BASOPHILS # BLD AUTO: 0.04 X10(3)/MCL
BASOPHILS NFR BLD AUTO: 1.1 %
BILIRUB SERPL-MCNC: 0.6 MG/DL
BILIRUB UR QL STRIP.AUTO: NEGATIVE
BUN SERPL-MCNC: 14 MG/DL (ref 9.8–20.1)
CALCIUM SERPL-MCNC: 10.4 MG/DL (ref 8.4–10.2)
CHLORIDE SERPL-SCNC: 107 MMOL/L (ref 98–107)
CHOLEST SERPL-MCNC: 223 MG/DL
CHOLEST/HDLC SERPL: 2 {RATIO} (ref 0–5)
CO2 SERPL-SCNC: 31 MMOL/L (ref 23–31)
COLOR UR AUTO: ABNORMAL
CREAT SERPL-MCNC: 0.76 MG/DL (ref 0.55–1.02)
DEPRECATED CALCIDIOL+CALCIFEROL SERPL-MC: 162.4 NG/ML (ref 30–80)
EOSINOPHIL # BLD AUTO: 0.15 X10(3)/MCL (ref 0–0.9)
EOSINOPHIL NFR BLD AUTO: 4 %
ERYTHROCYTE [DISTWIDTH] IN BLOOD BY AUTOMATED COUNT: 12.8 % (ref 11.5–17)
GFR SERPLBLD CREATININE-BSD FMLA CKD-EPI: >60 MLS/MIN/1.73/M2
GLOBULIN SER-MCNC: 2.7 GM/DL (ref 2.4–3.5)
GLUCOSE SERPL-MCNC: 92 MG/DL (ref 82–115)
GLUCOSE UR QL STRIP.AUTO: NORMAL
HCT VFR BLD AUTO: 41.6 % (ref 37–47)
HDLC SERPL-MCNC: 95 MG/DL (ref 35–60)
HGB BLD-MCNC: 13.7 G/DL (ref 12–16)
IMM GRANULOCYTES # BLD AUTO: 0.02 X10(3)/MCL (ref 0–0.04)
IMM GRANULOCYTES NFR BLD AUTO: 0.5 %
INR PPP: 0.9
KETONES UR QL STRIP.AUTO: NEGATIVE
LDLC SERPL CALC-MCNC: 109 MG/DL (ref 50–140)
LEUKOCYTE ESTERASE UR QL STRIP.AUTO: 500
LYMPHOCYTES # BLD AUTO: 0.89 X10(3)/MCL (ref 0.6–4.6)
LYMPHOCYTES NFR BLD AUTO: 23.9 %
MCH RBC QN AUTO: 31.9 PG (ref 27–31)
MCHC RBC AUTO-ENTMCNC: 32.9 G/DL (ref 33–36)
MCV RBC AUTO: 96.7 FL (ref 80–94)
MONOCYTES # BLD AUTO: 0.51 X10(3)/MCL (ref 0.1–1.3)
MONOCYTES NFR BLD AUTO: 13.7 %
NEUTROPHILS # BLD AUTO: 2.11 X10(3)/MCL (ref 2.1–9.2)
NEUTROPHILS NFR BLD AUTO: 56.8 %
NITRITE UR QL STRIP.AUTO: NEGATIVE
NON-SQ EPI CELLS URNS QL MICRO: ABNORMAL /HPF
NRBC BLD AUTO-RTO: 0 %
PH UR STRIP.AUTO: 7.5 [PH]
PLATELET # BLD AUTO: 227 X10(3)/MCL (ref 130–400)
PMV BLD AUTO: 10.4 FL (ref 7.4–10.4)
POTASSIUM SERPL-SCNC: 4.4 MMOL/L (ref 3.5–5.1)
PROT SERPL-MCNC: 7 GM/DL (ref 5.8–7.6)
PROT UR QL STRIP.AUTO: NEGATIVE
PROTHROMBIN TIME: 12.4 SECONDS (ref 12.5–14.5)
RBC # BLD AUTO: 4.3 X10(6)/MCL (ref 4.2–5.4)
RBC #/AREA URNS AUTO: ABNORMAL /HPF
RBC UR QL AUTO: NEGATIVE
SODIUM SERPL-SCNC: 143 MMOL/L (ref 136–145)
SP GR UR STRIP.AUTO: 1.02 (ref 1–1.03)
SQUAMOUS #/AREA URNS LPF: ABNORMAL /HPF
TRIGL SERPL-MCNC: 95 MG/DL (ref 37–140)
UROBILINOGEN UR STRIP-ACNC: NORMAL
VLDLC SERPL CALC-MCNC: 19 MG/DL
WBC # SPEC AUTO: 3.72 X10(3)/MCL (ref 4.5–11.5)
WBC #/AREA URNS AUTO: ABNORMAL /HPF

## 2023-12-13 PROCEDURE — 80053 COMPREHEN METABOLIC PANEL: CPT

## 2023-12-13 PROCEDURE — 87086 URINE CULTURE/COLONY COUNT: CPT

## 2023-12-13 PROCEDURE — 80061 LIPID PANEL: CPT

## 2023-12-13 PROCEDURE — 85610 PROTHROMBIN TIME: CPT

## 2023-12-13 PROCEDURE — 82306 VITAMIN D 25 HYDROXY: CPT

## 2023-12-13 PROCEDURE — 81001 URINALYSIS AUTO W/SCOPE: CPT

## 2023-12-13 PROCEDURE — 36415 COLL VENOUS BLD VENIPUNCTURE: CPT

## 2023-12-13 PROCEDURE — 85025 COMPLETE CBC W/AUTO DIFF WBC: CPT

## 2023-12-15 LAB
BACTERIA UR CULT: ABNORMAL

## 2023-12-19 ENCOUNTER — OFFICE VISIT (OUTPATIENT)
Dept: INTERNAL MEDICINE | Facility: CLINIC | Age: 68
End: 2023-12-19
Payer: MEDICARE

## 2023-12-19 VITALS
WEIGHT: 139 LBS | RESPIRATION RATE: 16 BRPM | DIASTOLIC BLOOD PRESSURE: 84 MMHG | HEART RATE: 104 BPM | OXYGEN SATURATION: 99 % | HEIGHT: 61 IN | TEMPERATURE: 98 F | SYSTOLIC BLOOD PRESSURE: 132 MMHG | BODY MASS INDEX: 26.24 KG/M2

## 2023-12-19 DIAGNOSIS — B07.0 PLANTAR WART: ICD-10-CM

## 2023-12-19 DIAGNOSIS — E78.2 MODERATE MIXED HYPERLIPIDEMIA NOT REQUIRING STATIN THERAPY: Primary | ICD-10-CM

## 2023-12-19 PROCEDURE — 99213 PR OFFICE/OUTPT VISIT, EST, LEVL III, 20-29 MIN: ICD-10-PCS | Mod: ,,, | Performed by: INTERNAL MEDICINE

## 2023-12-19 PROCEDURE — 99213 OFFICE O/P EST LOW 20 MIN: CPT | Mod: ,,, | Performed by: INTERNAL MEDICINE

## 2023-12-19 NOTE — PROGRESS NOTES
Subjective:      Patient ID: Ann-Marie Perea is a 68 y.o. female.    Chief Complaint: Hyperlipidemia      HPI:  68-year-old  female here today for hyperlipidemia follow-up she had a wellness visit 6 months ago but for whatever reason her cholesterol panel was not checked anyway she has been off of hormone blockers now for year and a half due for bone density early next year being monitored by her oncologist.  She currently is taking her statin medication every other day with current LDL of 109. CT calcium 40  Stage 2 invasive lobular carcinoma status post bilateral radical mastectomy with Morris See; reconstruction performed by Dr. Miller  Oncologist is Dr. Olivera   She has ER/ [facility] positive disease currently on anastrozole. Her daughter who is 41 was recently diagnosed with breast cancer, ductal type, hormone receptor positive.      Bone density exam 22 showed progression of osteopenia of the lumbar spine with a T score of -2.0 (previous -1.3) and stable osteopenia of both hips with a T score of -2.2 on the right and -1.5 on the left.  She does not wish to move for with treatment right now she will discuss with Dr. Olivera at her next visit in October, may be consideration for Evista?    GYN Dr. Duarte.  Mom with history of CVA in her 60s; from her carotids, atrial fib;  at 87 from heart failure  Dad with CABG in his 80s; alive at 91  [facility] for cards        Past Medical History:  Past Medical History:   Diagnosis Date    Impingement syndrome of right shoulder     Malignant neoplasm of upper-outer quadrant of left female breast     Osteopenia     RLS (restless legs syndrome)     Venous insufficiency      Past Surgical History:   Procedure Laterality Date    BILATERAL MASTECTOMY  2017    BREAST BIOPSY Left     BREAST SURGERY  2017    CARPAL TUNNEL RELEASE  2019    CERVICAL FUSION  2020    COLONOSCOPY  2019    ENDOMETRIAL ABLATION      KNEE SURGERY      SPINE SURGERY   12/30/20     Review of patient's allergies indicates:  No Known Allergies  Current Outpatient Medications on File Prior to Visit   Medication Sig Dispense Refill    B. animalis-vitamin D3 1 billion cell- 10 mcg/5 drops Drop Take 2 drops by mouth Daily.      calcium-vitamin D 250 mg-2.5 mcg (100 unit) per tablet Take 1 tablet by mouth once daily.      co-enzyme Q-10 30 mg capsule Take 100 mg by mouth once daily.      cranberry fruit concentrate (AZO CRANBERRY ORAL) Take 1 tablet by mouth once daily.      fexofenadine (ALLEGRA) 180 MG tablet Take 180 mg by mouth nightly.      fluticasone propionate (FLONASE) 50 mcg/actuation nasal spray 1 spray by Each Nostril route once daily.      glucosamine-chondroitin 500-400 mg tablet Take 2 tablets by mouth once daily.      multivit/folic acid/vit K1 (ONE-A-DAY WOMEN'S 50 PLUS ORAL) Take 1 tablet by mouth once daily.      omega-3 fatty acids/fish oil (FISH OIL-OMEGA-3 FATTY ACIDS) 300-1,000 mg capsule Take 1 capsule by mouth once daily.      pantoprazole (PROTONIX) 40 MG tablet Take 1 tablet (40 mg total) by mouth once daily. 90 tablet 1    rosuvastatin (CRESTOR) 10 MG tablet TAKE 1 TABLET BY MOUTH EVERY DAY 90 tablet 4    sodium bicarb-sodium chloride (NASAMIST) 2.7 % SprA 2 sprays by Nasal route nightly.      UNABLE TO FIND 2 drops Daily. medication name: Hi-Po Emulsi - D3      UNABLE TO FIND Collagen Peptides 1 scoop daily      UNABLE TO FIND 3 capsules Daily. Balance of Nature (Fruits and Veggies)      [DISCONTINUED] docusate sodium (COLACE) 100 MG capsule Take 100 mg by mouth Daily.      [DISCONTINUED] gabapentin (NEURONTIN) 100 MG capsule Take 1 capsule (100 mg total) by mouth 3 (three) times daily. 30 capsule 0    [DISCONTINUED] loratadine (CLARITIN) 10 mg tablet Take 10 mg by mouth nightly.      [DISCONTINUED] meloxicam (MOBIC) 15 MG tablet Take 15 mg by mouth.       No current facility-administered medications on file prior to visit.     Social History      Socioeconomic History    Marital status:    Occupational History    Occupation:  for 's law firm   Tobacco Use    Smoking status: Former     Current packs/day: 1.00     Types: Cigarettes    Smokeless tobacco: Never    Tobacco comments:     Quit over 40 years ago   Substance and Sexual Activity    Alcohol use: Yes     Alcohol/week: 7.0 - 14.0 standard drinks of alcohol     Types: 7 - 14 Glasses of wine per week    Drug use: Never    Sexual activity: Yes     Partners: Male     Birth control/protection: None     Social Determinants of Health     Financial Resource Strain: Low Risk  (6/19/2023)    Overall Financial Resource Strain (CARDIA)     Difficulty of Paying Living Expenses: Not hard at all   Food Insecurity: No Food Insecurity (6/19/2023)    Hunger Vital Sign     Worried About Running Out of Food in the Last Year: Never true     Ran Out of Food in the Last Year: Never true   Transportation Needs: No Transportation Needs (6/19/2023)    PRAPARE - Transportation     Lack of Transportation (Medical): No     Lack of Transportation (Non-Medical): No   Physical Activity: Sufficiently Active (6/19/2023)    Exercise Vital Sign     Days of Exercise per Week: 5 days     Minutes of Exercise per Session: 60 min   Recent Concern: Physical Activity - Insufficiently Active (4/4/2023)    Exercise Vital Sign     Days of Exercise per Week: 2 days     Minutes of Exercise per Session: 30 min   Stress: No Stress Concern Present (6/19/2023)    [employer] [employer] - Occupational Stress Questionnaire     Feeling of Stress : Not at all   Social Connections: Moderately Integrated (6/19/2023)    Social Connection and Isolation Panel [NHANES]     Frequency of Communication with Friends and Family: More than three times a week     Frequency of Social Gatherings with Friends and Family: More than three times a week     Attends Anabaptism Services: More than 4 times per year     Active Member of  "Clubs or Organizations: No     Attends Club or Organization Meetings: Never     Marital Status:    Housing Stability: Low Risk  (6/19/2023)    Housing Stability Vital Sign     Unable to Pay for Housing in the Last Year: No     Number of Places Lived in the Last Year: 1     Unstable Housing in the Last Year: No     Family History   Problem Relation Age of Onset    Stroke Mother     Breast cancer Daughter 41    Breast cancer Maternal Cousin        Review of Systems  A comprehensive review of systems was performed and was negative with exception of what is documented above.     Objective:   /84 (BP Location: Left arm, Patient Position: Sitting, BP Method: Medium (Manual))   Pulse 104   Temp 97.5 °F (36.4 °C) (Temporal)   Resp 16   Ht 5' 1" (1.549 m)   Wt 63 kg (139 lb)   SpO2 99%   BMI 26.26 kg/m²   Physical Exam  General : Alert and oriented, No acute distress, afebrile.  Eye : PERRLA. EOMI. Normal conjunctiva, Sclerae are nonicteric.   Musculoskeletal : Normal range of motion throughout. No muscle tenderness.  Integumentary : Warm, moist, intact.  Neurologic : Alert, Oriented  Psychiatric : Cooperative, Appropriate mood & affect.   Assessment/ Plan:   1. Plantar wart  -     Ambulatory referral/consult to Podiatry; Future; Expected date: 12/26/2023    2. Moderate mixed hyperlipidemia not requiring statin therapy  Assessment & Plan:  Lab Results   Component Value Date    .00 12/13/2023    TRIG 95 12/13/2023    HDL 95 (H) 12/13/2023    TOTALCHOLEST 2 12/13/2023     Continue current med management  Stressed importance of dietary modifications. Follow a low cholesterol, low saturated fat diet with less that 200mg of cholesterol a day.  Avoid fried foods and high saturated fats (high saturated fats less than 7% of calories).  Add Flax Seed/Fish Oil supplements to diet. Increase dietary fiber.  Regular exercise can reduce LDL and raise HDL. Stressed importance of physical activity 5 times per week " for 30 minutes per day.                Follow up in about 6 months (around 6/19/2024) for with labs prior to visit, Medicare Wellness.

## 2023-12-19 NOTE — ASSESSMENT & PLAN NOTE
Lab Results   Component Value Date    .00 12/13/2023    TRIG 95 12/13/2023    HDL 95 (H) 12/13/2023    TOTALCHOLEST 2 12/13/2023     Continue current med management  Stressed importance of dietary modifications. Follow a low cholesterol, low saturated fat diet with less that 200mg of cholesterol a day.  Avoid fried foods and high saturated fats (high saturated fats less than 7% of calories).  Add Flax Seed/Fish Oil supplements to diet. Increase dietary fiber.  Regular exercise can reduce LDL and raise HDL. Stressed importance of physical activity 5 times per week for 30 minutes per day.

## 2024-05-14 NOTE — PROGRESS NOTES
Subjective:       Patient ID: Ann-Marie Perea is a 69 y.o. female.    Chief Complaint:  No problems    Diagnosis: Multifocal stage IIA left breast invasive lobular carcinoma (T2 N0(i+) M0), 2.9/2.1 cm, GII, 1 IHC+ SN,                        ER/SD +, HER-2 negative, Oncotype RS 8                    S/p bilateral mastectomies                    Postmenopausal with intact uterus    Treatment History: Arimidex 6/17-12/22    Current Treatment: Observation    Clinical History:  Patient initially presented 3/17 with mild left nipple retraction on self examination. Her last mammogram had been 11/14. She had a history of a benign breast biopsy on the left side several years earlier. She was referred for a mammogram 3/21/17 showing 3 suspicious lesions in the left breast. 2 of these lesions were biopsied and positive for invasive lobular carcinoma. CT PET scan 4/3/17 showed mildly increased hypermetabolic activity in the left breast compared to the right but no other abnormal activity to suggest metastatic disease. She underwent bilateral mastectomies with placement of tissue expanders and left sentinel lymph node dissection 4/17/17. Right breast showed atypical lobular hyperplasia and sclerosing adenosis. Left breast showed 2 foci of grade II invasive lobular carcinoma measuring 2.9 cm at 3:00 and 2.1 cm at 8:00. [place] lymph node #1 showed isolated tumor cells by IHC. [place] lymph nodes # 2 and 3 were negative and 3 additional axillary nodes were also negative. ER +96%, SD +94% and HER-2 negative by FISH. She was seen postoperatively by Dr. Lee. Oncotype testing showed a low risk recurrence score of 8 which was equivalent to a 6% 10 year risk of recurrence in Tamoxifen treated patients. Adjuvant hormonal therapy was recommended with an aromatase inhibitor for 5-10 years. Baseline bone density exam 6/9/17 showed osteopenia of the lumbar spine with a T score of -1.8 and both hips with T-scores of -1.5 on the left  and -1.1 on the right. There was osteoporosis of the left femoral neck at -2.6. She was started on a calcium and vitamin D supplement. Treatment was discussed with Prolia but she had significant concerns regarding the side effect profile. Her postoperative course was complicated by development of an infected left tissue expander requiring removal and antibiotic therapy. She eventually required a left latissimus flap and was able to have her expander replaced.    [facility] to establish ongoing Oncology care due to the care home of Dr. Rivero. She was on Arimidex 1 mg daily. She still had bilateral tissue expanders in place. Her daughter had been diagnosed with breast cancer at the age of 41 in [place]. The patient tested negative for BRCA1 and BRCA2 mutations. When she was seen for follow-up 7/19/18, she complained of discomfort and thickening in the left axilla. She had no palpable masses on her clinical exam. Ultrasound of the left breast and axilla 7/25/18 showed benign appearing fibrofatty tissue with no masses, cyst or other abnormalities.  She completed breast reconstruction with implant placement and bilateral nipple tattooing.  Arimidex therapy was discontinued 12/22 after 5-1/2 years of treatment.    Interval History  She returns to the office today by herself for a six-month surveillance visit.  She continues to feel well.  She has some intermittent low back pain.  She has not had any recent illnesses or problems.  She reports no changes on her self-breast examination following bilateral mastectomies and reconstruction.  She is now 7 years out from her initial diagnosis and surgical resection.  Follow-up bone density exam 5/16/24 showed osteopenia of both hips with a T-score of-2.4 on the right and -1.6 on the left, not significantly changed from 04/22.  There was osteoporosis of both femoral necks with a T-score of -2.5 on the left and -2.9 on the right.   Left forearm T-score was -2.1.  Laboratory testing for this visit showed no significant abnormalities.      Review of Systems   Constitutional:  Negative for appetite change, fatigue, fever and unexpected weight change.   HENT:  Negative for mouth sores, sore throat and trouble swallowing.    Eyes: Negative.    Respiratory:  Negative for cough and shortness of breath.    Cardiovascular:  Negative for chest pain, palpitations and leg swelling.   Gastrointestinal:  Negative for abdominal distention, abdominal pain, constipation, diarrhea, nausea and vomiting.   Genitourinary:  Negative for dysuria, frequency and urgency.   Musculoskeletal:  Positive for arthralgias and back pain (Mild low back pain).   Integumentary:  Negative for pallor and rash.   Neurological:  Negative for dizziness, weakness, numbness and headaches.   Hematological:  Negative for adenopathy. Does not bruise/bleed easily.   Psychiatric/Behavioral: Negative.         PMHx:  Osteopenia, venous insufficiency, restless legs  Menarche age 13, first pregnancy 21, 3 children (+ breast fed), menopause early 50s, no HRT.  PSHx:  Bilateral mastectomies 4/17, endometrial ablation, left breast biopsy, cervical fusion 12/20, right TKR 4/23  SH:  Former smoker 1 PPD, quit 1980s. + Social alcohol use. Lives in [place] with her . Works as a  for her 's law firm.  FH:  Her daughter was diagnosed with breast cancer age 41. A maternal first cousin also had breast cancer.     Objective:        /81   Pulse 86   Temp 98.1 °F (36.7 °C)   Resp 15   Ht 5' (1.524 m)   Wt 65.9 kg (145 lb 3.2 oz)   SpO2 98%   BMI 28.36 kg/m²    Physical Exam  Constitutional:       Comments: Well-developed white female in NAD   HENT:      Head: Normocephalic.      Mouth/Throat:      Mouth: Mucous membranes are moist.      Pharynx: Oropharynx is clear. No posterior oropharyngeal erythema.   Eyes:      Extraocular Movements: Extraocular movements intact.       Conjunctiva/sclera: Conjunctivae normal.      Pupils: Pupils are equal, round, and reactive to light.   Neck:      Comments: Well-healed incision left anterior neck.  Cardiovascular:      Rate and Rhythm: Normal rate and regular rhythm.      Heart sounds: No murmur heard.  Pulmonary:      Comments: Lungs clear to auscultation.  Chest:      Comments: Bilateral mastectomies with implant reconstruction and left latissimus dorsi flap.  No suspicious masses, skin changes or axillary nodes bilaterally.  Abdominal:      General: Bowel sounds are normal. There is no distension.      Palpations: Abdomen is soft. There is no mass.      Tenderness: There is no abdominal tenderness.   Musculoskeletal:         General: No swelling or tenderness. Normal range of motion.      Cervical back: Neck supple. No tenderness.      Comments: Well-healed right knee incision   Lymphadenopathy:      Cervical: No cervical adenopathy.   Skin:     General: Skin is warm and dry.      Findings: No rash.   Neurological:      General: No focal deficit present.      Mental Status: She is alert and oriented to person, place, and time.      Cranial Nerves: No cranial nerve deficit.      Motor: No weakness.       ECOG SCORE    0 - Fully active-able to carry on all pre-disease performance without restriction          LABORATORY  Recent Results (from the past 168 hour(s))   CEA    Collection Time: 05/16/24  9:01 AM   Result Value Ref Range    Carcinoembryonic Antigen <1.73 0.00 - 3.00 ng/mL   Comprehensive Metabolic Panel    Collection Time: 05/16/24  9:01 AM   Result Value Ref Range    Sodium 139 136 - 145 mmol/L    Potassium 4.8 3.5 - 5.1 mmol/L    Chloride 106 98 - 107 mmol/L    CO2 28 23 - 31 mmol/L    Glucose 92 82 - 115 mg/dL    Blood Urea Nitrogen 13.3 9.8 - 20.1 mg/dL    Creatinine 0.75 0.55 - 1.02 mg/dL    Calcium 9.9 8.4 - 10.2 mg/dL    Protein Total 7.1 5.8 - 7.6 gm/dL    Albumin 4.2 3.4 - 4.8 g/dL    Globulin 2.9 2.4 - 3.5 gm/dL     Albumin/Globulin Ratio 1.4 1.1 - 2.0 ratio    Bilirubin Total 0.4 <=1.5 mg/dL    ALP 69 40 - 150 unit/L    ALT 18 0 - 55 unit/L    AST 26 5 - 34 unit/L    eGFR >60 mL/min/1.73/m2   CBC with Differential    Collection Time: 05/16/24  9:01 AM   Result Value Ref Range    WBC 5.25 4.50 - 11.50 x10(3)/mcL    RBC 4.17 (L) 4.20 - 5.40 x10(6)/mcL    Hgb 13.4 12.0 - 16.0 g/dL    Hct 40.4 37.0 - 47.0 %    MCV 96.9 (H) 80.0 - 94.0 fL    MCH 32.1 (H) 27.0 - 31.0 pg    MCHC 33.2 33.0 - 36.0 g/dL    RDW 12.3 11.5 - 17.0 %    Platelet 250 130 - 400 x10(3)/mcL    MPV 9.6 7.4 - 10.4 fL    Neut % 61.4 %    Lymph % 20.6 %    Mono % 12.2 %    Eos % 4.8 %    Basophil % 0.8 %    Lymph # 1.08 0.6 - 4.6 x10(3)/mcL    Neut # 3.23 2.1 - 9.2 x10(3)/mcL    Mono # 0.64 0.1 - 1.3 x10(3)/mcL    Eos # 0.25 0 - 0.9 x10(3)/mcL    Baso # 0.04 <=0.2 x10(3)/mcL    IG# 0.01 0 - 0.04 x10(3)/mcL    IG% 0.2 %   Cancer Antigen 27-29    Collection Time: 05/16/24  9:01 AM   Result Value Ref Range    Breast Carcinoma Assoc Ag(CA 27.29) 12.0 <=38.0 U/mL       Assessment:   Multifocal stage IIA left breast invasive lobular carcinoma (T2 N0(i) M0) - [facility]  Osteopenia/osteoporosis      Plan:   Patient continues to do well with no suspicious findings on her clinical breast exam.  Bone density exam shows osteopenia/osteoporosis without interval improvement off of aromatase inhibitor therapy.  Additional options for treatment were discussed including weekly Fosamax versus daily Raloxifene.  Benefits, risks and side effect profiles were discussed as well.  She does not want to consider therapy at this time due to concern regarding side-effects.  I encouraged her to discuss therapeutic options with her PCP at her scheduled visit in June.  She will return here in 1 year for a follow-up visit and clinical exam for ongoing breast cancer surveillance.      DIONICIO ROMO MD    Other Physicians  Dr. John Miller

## 2024-05-16 ENCOUNTER — HOSPITAL ENCOUNTER (OUTPATIENT)
Dept: RADIOLOGY | Facility: HOSPITAL | Age: 69
Discharge: HOME | End: 2024-05-16
Attending: INTERNAL MEDICINE
Payer: MEDICARE

## 2024-05-16 DIAGNOSIS — Z78.0 OSTEOPENIA AFTER MENOPAUSE: ICD-10-CM

## 2024-05-16 DIAGNOSIS — M85.80 OSTEOPENIA AFTER MENOPAUSE: ICD-10-CM

## 2024-05-16 PROCEDURE — 77081 DXA BONE DENSITY APPENDICULR: CPT | Mod: XU,TC

## 2024-05-16 PROCEDURE — 77080 DXA BONE DENSITY AXIAL: CPT | Mod: TC

## 2024-05-16 PROCEDURE — 77080 DXA BONE DENSITY AXIAL: CPT | Mod: 26,,, | Performed by: RADIOLOGY

## 2024-05-16 PROCEDURE — 77081 DXA BONE DENSITY APPENDICULR: CPT | Mod: 26,XU,, | Performed by: RADIOLOGY

## 2024-05-20 ENCOUNTER — OFFICE VISIT (OUTPATIENT)
Dept: HEMATOLOGY/ONCOLOGY | Facility: CLINIC | Age: 69
End: 2024-05-20
Payer: MEDICARE

## 2024-05-20 VITALS
WEIGHT: 145.19 LBS | HEART RATE: 86 BPM | DIASTOLIC BLOOD PRESSURE: 81 MMHG | BODY MASS INDEX: 28.51 KG/M2 | OXYGEN SATURATION: 98 % | HEIGHT: 60 IN | RESPIRATION RATE: 15 BRPM | SYSTOLIC BLOOD PRESSURE: 136 MMHG | TEMPERATURE: 98 F

## 2024-05-20 DIAGNOSIS — C50.412 MALIGNANT NEOPLASM OF UPPER-OUTER QUADRANT OF LEFT BREAST IN FEMALE, ESTROGEN RECEPTOR POSITIVE: Primary | ICD-10-CM

## 2024-05-20 DIAGNOSIS — Z17.0 MALIGNANT NEOPLASM OF UPPER-OUTER QUADRANT OF LEFT BREAST IN FEMALE, ESTROGEN RECEPTOR POSITIVE: Primary | ICD-10-CM

## 2024-05-20 PROCEDURE — 99214 OFFICE O/P EST MOD 30 MIN: CPT | Mod: PBBFAC | Performed by: INTERNAL MEDICINE

## 2024-05-20 PROCEDURE — 99214 OFFICE O/P EST MOD 30 MIN: CPT | Mod: S$PBB,,, | Performed by: INTERNAL MEDICINE

## 2024-05-20 PROCEDURE — 99999 PR PBB SHADOW E&M-EST. PATIENT-LVL IV: CPT | Mod: PBBFAC,,, | Performed by: INTERNAL MEDICINE

## 2024-05-30 ENCOUNTER — TELEPHONE (OUTPATIENT)
Dept: INTERNAL MEDICINE | Facility: CLINIC | Age: 69
End: 2024-05-30
Payer: MEDICARE

## 2024-05-30 DIAGNOSIS — R03.0 ELEVATED BLOOD PRESSURE READING: Primary | ICD-10-CM

## 2024-05-30 DIAGNOSIS — E55.9 VITAMIN D DEFICIENCY: ICD-10-CM

## 2024-05-30 DIAGNOSIS — E78.2 MODERATE MIXED HYPERLIPIDEMIA NOT REQUIRING STATIN THERAPY: ICD-10-CM

## 2024-05-30 NOTE — TELEPHONE ENCOUNTER
----- Message from Ajay Correa MA sent at 5/30/2024 11:41 AM CDT -----  Regarding: PV 6/20/24 @ 1:20 Dr. Hills  1. Are there any outstanding tasks in the patient's chart? Yes, fasting labs    2. Is there any documentation in the chart? No    3.Has patient been seen in an ER, Urgent care clinic, or been admitted since last visit?  If yes, When, where, and why    4. Has patient seen any other healthcare providers since last visit?  If yes, when, where, and why    5. Has patient had any bloodwork or XR done since last visit?    6. Is patient signed up for patient portal?

## 2024-06-12 ENCOUNTER — LAB VISIT (OUTPATIENT)
Dept: LAB | Facility: HOSPITAL | Age: 69
End: 2024-06-12
Attending: INTERNAL MEDICINE
Payer: MEDICARE

## 2024-06-12 DIAGNOSIS — E78.2 MODERATE MIXED HYPERLIPIDEMIA NOT REQUIRING STATIN THERAPY: ICD-10-CM

## 2024-06-12 DIAGNOSIS — E55.9 VITAMIN D DEFICIENCY: ICD-10-CM

## 2024-06-12 DIAGNOSIS — R03.0 ELEVATED BLOOD PRESSURE READING: ICD-10-CM

## 2024-06-12 LAB
25(OH)D3+25(OH)D2 SERPL-MCNC: 79 NG/ML (ref 30–80)
ALBUMIN SERPL-MCNC: 4.3 G/DL (ref 3.4–4.8)
ALBUMIN/GLOB SERPL: 1.5 RATIO (ref 1.1–2)
ALP SERPL-CCNC: 65 UNIT/L (ref 40–150)
ALT SERPL-CCNC: 21 UNIT/L (ref 0–55)
ANION GAP SERPL CALC-SCNC: 8 MEQ/L
AST SERPL-CCNC: 27 UNIT/L (ref 5–34)
BACTERIA #/AREA URNS AUTO: ABNORMAL /HPF
BASOPHILS # BLD AUTO: 0.04 X10(3)/MCL
BASOPHILS NFR BLD AUTO: 1 %
BILIRUB SERPL-MCNC: 0.5 MG/DL
BILIRUB UR QL STRIP.AUTO: NEGATIVE
BUN SERPL-MCNC: 16.7 MG/DL (ref 9.8–20.1)
CALCIUM SERPL-MCNC: 10.5 MG/DL (ref 8.4–10.2)
CHLORIDE SERPL-SCNC: 106 MMOL/L (ref 98–107)
CHOLEST SERPL-MCNC: 235 MG/DL
CHOLEST/HDLC SERPL: 2 {RATIO} (ref 0–5)
CLARITY UR: CLEAR
CO2 SERPL-SCNC: 27 MMOL/L (ref 23–31)
COLOR UR AUTO: ABNORMAL
CREAT SERPL-MCNC: 0.77 MG/DL (ref 0.55–1.02)
CREAT/UREA NIT SERPL: 22
EOSINOPHIL # BLD AUTO: 0.21 X10(3)/MCL (ref 0–0.9)
EOSINOPHIL NFR BLD AUTO: 5.4 %
ERYTHROCYTE [DISTWIDTH] IN BLOOD BY AUTOMATED COUNT: 12.7 % (ref 11.5–17)
GFR SERPLBLD CREATININE-BSD FMLA CKD-EPI: >60 ML/MIN/1.73/M2
GLOBULIN SER-MCNC: 2.9 GM/DL (ref 2.4–3.5)
GLUCOSE SERPL-MCNC: 93 MG/DL (ref 82–115)
GLUCOSE UR QL STRIP: NORMAL
HCT VFR BLD AUTO: 41.1 % (ref 37–47)
HDLC SERPL-MCNC: 96 MG/DL (ref 35–60)
HGB BLD-MCNC: 13.8 G/DL (ref 12–16)
HGB UR QL STRIP: NEGATIVE
IMM GRANULOCYTES # BLD AUTO: 0.01 X10(3)/MCL (ref 0–0.04)
IMM GRANULOCYTES NFR BLD AUTO: 0.3 %
KETONES UR QL STRIP: NEGATIVE
LDLC SERPL CALC-MCNC: 118 MG/DL (ref 50–140)
LEUKOCYTE ESTERASE UR QL STRIP: 250
LYMPHOCYTES # BLD AUTO: 1.04 X10(3)/MCL (ref 0.6–4.6)
LYMPHOCYTES NFR BLD AUTO: 26.9 %
MCH RBC QN AUTO: 32.2 PG (ref 27–31)
MCHC RBC AUTO-ENTMCNC: 33.6 G/DL (ref 33–36)
MCV RBC AUTO: 95.8 FL (ref 80–94)
MONOCYTES # BLD AUTO: 0.5 X10(3)/MCL (ref 0.1–1.3)
MONOCYTES NFR BLD AUTO: 13 %
MUCOUS THREADS URNS QL MICRO: ABNORMAL /LPF
NEUTROPHILS # BLD AUTO: 2.06 X10(3)/MCL (ref 2.1–9.2)
NEUTROPHILS NFR BLD AUTO: 53.4 %
NITRITE UR QL STRIP: NEGATIVE
NRBC BLD AUTO-RTO: 0 %
PH UR STRIP: 7 [PH]
PLATELET # BLD AUTO: 234 X10(3)/MCL (ref 130–400)
PMV BLD AUTO: 10.9 FL (ref 7.4–10.4)
POTASSIUM SERPL-SCNC: 4.7 MMOL/L (ref 3.5–5.1)
PROT SERPL-MCNC: 7.2 GM/DL (ref 5.8–7.6)
PROT UR QL STRIP: NEGATIVE
RBC # BLD AUTO: 4.29 X10(6)/MCL (ref 4.2–5.4)
RBC #/AREA URNS AUTO: ABNORMAL /HPF
SODIUM SERPL-SCNC: 141 MMOL/L (ref 136–145)
SP GR UR STRIP.AUTO: 1.01 (ref 1–1.03)
SQUAMOUS #/AREA URNS LPF: ABNORMAL /HPF
TRIGL SERPL-MCNC: 105 MG/DL (ref 37–140)
TSH SERPL-ACNC: 0.74 UIU/ML (ref 0.35–4.94)
UROBILINOGEN UR STRIP-ACNC: NORMAL
VLDLC SERPL CALC-MCNC: 21 MG/DL
WBC # SPEC AUTO: 3.86 X10(3)/MCL (ref 4.5–11.5)
WBC #/AREA URNS AUTO: ABNORMAL /HPF

## 2024-06-12 PROCEDURE — 81001 URINALYSIS AUTO W/SCOPE: CPT

## 2024-06-12 PROCEDURE — 36415 COLL VENOUS BLD VENIPUNCTURE: CPT

## 2024-06-12 PROCEDURE — 82306 VITAMIN D 25 HYDROXY: CPT

## 2024-06-12 PROCEDURE — 80061 LIPID PANEL: CPT

## 2024-06-12 PROCEDURE — 85025 COMPLETE CBC W/AUTO DIFF WBC: CPT

## 2024-06-12 PROCEDURE — 80053 COMPREHEN METABOLIC PANEL: CPT

## 2024-06-12 PROCEDURE — 84443 ASSAY THYROID STIM HORMONE: CPT

## 2024-06-20 ENCOUNTER — OFFICE VISIT (OUTPATIENT)
Dept: INTERNAL MEDICINE | Facility: CLINIC | Age: 69
End: 2024-06-20
Payer: MEDICARE

## 2024-06-20 VITALS
HEART RATE: 86 BPM | BODY MASS INDEX: 28.66 KG/M2 | DIASTOLIC BLOOD PRESSURE: 80 MMHG | SYSTOLIC BLOOD PRESSURE: 132 MMHG | WEIGHT: 146 LBS | OXYGEN SATURATION: 99 % | TEMPERATURE: 98 F | HEIGHT: 60 IN

## 2024-06-20 DIAGNOSIS — C50.919 MALIGNANT NEOPLASM OF FEMALE BREAST, UNSPECIFIED ESTROGEN RECEPTOR STATUS, UNSPECIFIED LATERALITY, UNSPECIFIED SITE OF BREAST: ICD-10-CM

## 2024-06-20 DIAGNOSIS — M81.0 OSTEOPOROSIS, UNSPECIFIED OSTEOPOROSIS TYPE, UNSPECIFIED PATHOLOGICAL FRACTURE PRESENCE: ICD-10-CM

## 2024-06-20 DIAGNOSIS — Z17.0 MALIGNANT NEOPLASM OF UPPER-OUTER QUADRANT OF LEFT BREAST IN FEMALE, ESTROGEN RECEPTOR POSITIVE: ICD-10-CM

## 2024-06-20 DIAGNOSIS — C50.412 MALIGNANT NEOPLASM OF UPPER-OUTER QUADRANT OF LEFT BREAST IN FEMALE, ESTROGEN RECEPTOR POSITIVE: ICD-10-CM

## 2024-06-20 DIAGNOSIS — E78.5 HYPERLIPIDEMIA, UNSPECIFIED HYPERLIPIDEMIA TYPE: ICD-10-CM

## 2024-06-20 DIAGNOSIS — M81.0 AGE-RELATED OSTEOPOROSIS WITHOUT CURRENT PATHOLOGICAL FRACTURE: ICD-10-CM

## 2024-06-20 DIAGNOSIS — Z78.0 POSTMENOPAUSAL: ICD-10-CM

## 2024-06-20 DIAGNOSIS — K21.9 GERD WITHOUT ESOPHAGITIS: ICD-10-CM

## 2024-06-20 DIAGNOSIS — Z00.00 MEDICARE ANNUAL WELLNESS VISIT, SUBSEQUENT: Primary | ICD-10-CM

## 2024-06-20 PROBLEM — M17.11 PRIMARY OSTEOARTHRITIS OF RIGHT KNEE: Status: RESOLVED | Noted: 2022-03-30 | Resolved: 2024-06-20

## 2024-06-20 RX ORDER — DICLOFENAC SODIUM 10 MG/G
2 GEL TOPICAL 2 TIMES DAILY
COMMUNITY
Start: 2024-02-20

## 2024-06-20 RX ORDER — RISEDRONATE SODIUM 150 MG/1
150 TABLET, FILM COATED ORAL
Qty: 3 TABLET | Refills: 3 | Status: SHIPPED | OUTPATIENT
Start: 2024-06-20 | End: 2025-06-20

## 2024-06-20 NOTE — ASSESSMENT & PLAN NOTE
General health maintenance education given, labs reviewed.  Age-appropriate exams and vaccines are up-to-date.  RTC 6 months with labs.  Orders placed

## 2024-06-20 NOTE — ASSESSMENT & PLAN NOTE
Continue calcium and vitamin-D, Rx Risedronate 150 mg Q monthly sent to pharmacy  Recheck labs in 6 months  DEXA in 12 months

## 2024-06-20 NOTE — ASSESSMENT & PLAN NOTE
Patient with known ASCVD advised on daily dosing of Crestor if unable to tolerate she needs to let me know and we can try a lower intensity statin.  Recheck lipids in 6 months with goal LDL of less than 70

## 2024-06-20 NOTE — PROGRESS NOTES
Internal Medicine      Patient ID: [id number]     Chief Complaint: Medicare Annual Wellness     HPI:     Ann-Marie Perea is a 69 y.o. female here today for a Medicare Annual Wellness visit and comprehensive Health Risk Assessment.   Stage 2 invasive lobular carcinoma status post bilateral radical mastectomy with Morris See; reconstruction performed by Dr. Miller  Oncologist is Dr. Olivera   She has ER/ UT positive disease currently on anastrozole. Her daughter who is 41 was recently diagnosed with breast cancer, ductal type, hormone receptor positive- now off hormone blockade-- 7 years out  GYN Dr. Duarte.  Mom with history of CVA in her 60s; from her carotids, atrial fib;  at 87 from heart failure  Dad with CABG in his 80s; alive at 91  [facility] for cards; calcium score 40; LDL at 118 --  She has not taking her cholesterol pill daily advised to take it daily  Also discussed that she needs to be on a medication for her osteoporosis she is amenable to monthly Risedronate does not want to take medication weekly or daily and does not want a injectable.  Left femoral neck -2.5, right femoral neck -2.9        A separate E/M code has been provided to evaluate additional complaints that the patient would like addressed during the dedicated Medicare Wellness Exam.    Health Maintenance         Date Due Completion Date    Hepatitis C Screening Never done ---    RSV Vaccine (Age 60+ and Pregnant patients) (1 - 1-dose 60+ series) Never done ---    Shingles Vaccine (2 of 2) 2021 3/23/2021    COVID-19 Vaccine (3 - Moderna risk series) 2021    Influenza Vaccine (Season Ended) 2024 ---    Hemoglobin A1c (Diabetic Prevention Screening) 2026 3/24/2023    TETANUS VACCINE 2026    DEXA Scan 2027    Lipid Panel 2029    Colorectal Cancer Screening 2029             Past Medical History:   Diagnosis Date     Impingement syndrome of right shoulder     Malignant neoplasm of upper-outer quadrant of left female breast     Osteopenia     RLS (restless legs syndrome)     Venous insufficiency         Past Surgical History:   Procedure Laterality Date    BILATERAL MASTECTOMY  04/2017    BREAST BIOPSY Left     BREAST SURGERY  2017    CARPAL TUNNEL RELEASE  09/18/2019    CERVICAL FUSION  12/2020    COLONOSCOPY  06/27/2019    ENDOMETRIAL ABLATION      KNEE SURGERY      SPINE SURGERY  12/30/20        Social History     Socioeconomic History    Marital status:    Occupational History    Occupation:  for 's law firm   Tobacco Use    Smoking status: Former     Current packs/day: 1.00     Types: Cigarettes    Smokeless tobacco: Never    Tobacco comments:     Quit over 40 years ago   Substance and Sexual Activity    Alcohol use: Yes     Alcohol/week: 7.0 - 14.0 standard drinks of alcohol     Types: 7 - 14 Glasses of wine per week    Drug use: Never    Sexual activity: Yes     Partners: Male     Birth control/protection: None     Social Determinants of Health     Financial Resource Strain: Low Risk  (6/19/2023)    Overall Financial Resource Strain (CARDIA)     Difficulty of Paying Living Expenses: Not hard at all   Food Insecurity: Unknown (6/19/2023)    Hunger Vital Sign     Worried About Running Out of Food in the Last Year: Never true   Transportation Needs: No Transportation Needs (6/19/2023)    PRAPARE - Transportation     Lack of Transportation (Medical): No     Lack of Transportation (Non-Medical): No   Physical Activity: Sufficiently Active (6/19/2023)    Exercise Vital Sign     Days of Exercise per Week: 5 days     Minutes of Exercise per Session: 60 min   Recent Concern: Physical Activity - Insufficiently Active (4/4/2023)    Exercise Vital Sign     Days of Exercise per Week: 2 days     Minutes of Exercise per Session: 30 min   Stress: No Stress Concern Present (6/19/2023)    [facility]  [facility] - Occupational Stress Questionnaire     Feeling of Stress : Not at all   Housing Stability: Unknown (6/19/2023)    Housing Stability Vital Sign     Unable to Pay for Housing in the Last Year: No     Unstable Housing in the Last Year: No        Family History   Problem Relation Name Age of Onset    Stroke Mother Mother     Breast cancer Daughter  41    Breast cancer Maternal Cousin          Current Outpatient Medications   Medication Instructions    B. animalis-vitamin D3 1 billion cell- 10 mcg/5 drops Drop 2 drops, Oral, Daily    calcium-vitamin D 250 mg-2.5 mcg (100 unit) per tablet 1 tablet, Oral, Daily    co-enzyme Q-10 100 mg, Oral, Daily    cranberry fruit concentrate (AZO CRANBERRY ORAL) 1 tablet, Oral, Daily    diclofenac sodium (VOLTAREN) 2 g, Topical (Top), 2 times daily    fexofenadine (ALLEGRA) 180 mg, Oral, Nightly    fluticasone propionate (FLONASE) 50 mcg/actuation nasal spray 1 spray, Each Nostril, Daily    glucosamine-chondroitin 500-400 mg tablet 2 tablets, Oral, Daily    multivit/folic acid/vit K1 (ONE-A-DAY WOMEN'S 50 PLUS ORAL) 1 tablet, Oral, Daily    omega-3 fatty acids/fish oil (FISH OIL-OMEGA-3 FATTY ACIDS) 300-1,000 mg capsule 1 capsule, Oral, Daily    pantoprazole (PROTONIX) 40 mg, Oral, Daily    risedronate (ACTONEL) 150 mg, Oral, Every 30 days    rosuvastatin (CRESTOR) 10 MG tablet TAKE 1 TABLET BY MOUTH EVERY DAY    sodium bicarb-sodium chloride (NASAMIST) 2.7 % SprA 2 sprays, Nasal, Nightly    UNABLE TO FIND 2 drops, Daily, medication name: Hi-Po Emulsi - D3     UNABLE TO FIND Collagen Peptides 1 scoop daily    UNABLE TO FIND 3 capsules, Daily       Review of patient's allergies indicates:  No Known Allergies     Immunization History   Administered Date(s) Administered    COVID-19, MRNA, LN-S, PF (MODERNA FULL 0.5 ML DOSE) 07/15/2021, 08/12/2021    Pneumococcal Conjugate - 13 Valent 04/28/2021    Pneumococcal Conjugate - 20 Valent 06/15/2022    Tdap 08/23/2016     Zoster 10/19/2016, 10/06/2020, 03/23/2021    Zoster Recombinant 03/23/2021        Patient Care Team:  John Egan MD as PCP - General (Internal Medicine)    Subjective:     Review of Systems    12 point review of systems conducted, negative except as stated in the history of present illness. See HPI for details.    Objective:     Visit Vitals  /80   Pulse 86   Temp 98 °F (36.7 °C) (Temporal)   Ht 5' (1.524 m)   Wt 66.2 kg (146 lb)   SpO2 99%   BMI 28.51 kg/m²       Physical Exam  Constitutional:       Appearance: Normal appearance.   HENT:      Head: Normocephalic and atraumatic.   Eyes:      Extraocular Movements: Extraocular movements intact.      Pupils: Pupils are equal, round, and reactive to light.   Cardiovascular:      Rate and Rhythm: Normal rate and regular rhythm.   Pulmonary:      Effort: Pulmonary effort is normal.      Breath sounds: Normal breath sounds.   Skin:     General: Skin is warm and dry.   Neurological:      General: No focal deficit present.      Mental Status: She is alert.   Psychiatric:         Mood and Affect: Mood normal.           Labs Reviewed:     Chemistry:  Lab Results   Component Value Date     06/12/2024    K 4.7 06/12/2024    BUN 16.7 06/12/2024    CREATININE 0.77 06/12/2024    EGFRNORACEVR >60 06/12/2024    GLUCOSE 93 06/12/2024    CALCIUM 10.5 (H) 06/12/2024    ALKPHOS 65 06/12/2024    LABPROT 7.2 06/12/2024    ALBUMIN 4.3 06/12/2024    BILIDIR 0.2 04/28/2022    IBILI 0.30 04/28/2022    AST 27 06/12/2024    ALT 21 06/12/2024    [id number] 79 06/12/2024    TSH 0.743 06/12/2024        Lab Results   Component Value Date    HGBA1C 5.1 12/16/2020        Hematology:  Lab Results   Component Value Date    WBC 3.86 (L) 06/12/2024    HGB 13.8 06/12/2024    HCT 41.1 06/12/2024     06/12/2024       Lipid Panel:  Lab Results   Component Value Date    CHOL 235 (H) 06/12/2024    HDL 96 (H) 06/12/2024    .00 06/12/2024    TRIG 105 06/12/2024     TOTALCHOLEST 2 06/12/2024        Urine:  Lab Results   Component Value Date    APPEARANCEUA Clear 06/12/2024    SGUA 1.014 06/12/2024    PROTEINUA Negative 06/12/2024    KETONESUA Negative 06/12/2024    LEUKOCYTESUR 250 (A) 06/12/2024    RBCUA 0-5 06/12/2024    WBCUA 0-5 06/12/2024    BACTERIA None Seen 06/12/2024    SQEPUA Trace 06/12/2024        Assessment:       ICD-10-CM ICD-9-CM   1. Medicare annual wellness visit, subsequent  Z00.00 V70.0   2. Postmenopausal  Z78.0 V49.81   3. Osteoporosis, unspecified osteoporosis type, unspecified pathological fracture presence  M81.0 733.00   4. Hyperlipidemia, unspecified hyperlipidemia type  E78.5 272.4   5. Malignant neoplasm of female breast, unspecified estrogen receptor status, unspecified laterality, unspecified site of breast  C50.919 174.9   6. Age-related osteoporosis without current pathological fracture  M81.0 733.01   7. Malignant neoplasm of upper-outer quadrant of left breast in female, estrogen receptor positive  C50.412 174.4    Z17.0 V86.0   8. GERD without esophagitis  K21.9 530.81        Plan:     1. Medicare annual wellness visit, subsequent  Assessment & Plan:  General health maintenance education given, labs reviewed.  Age-appropriate exams and vaccines are up-to-date.  RTC 6 months with labs.  Orders placed      2. Postmenopausal  -     DXA Bone Density Axial Skeleton 1 or more sites; Future; Expected date: 06/20/2024    3. Osteoporosis, unspecified osteoporosis type, unspecified pathological fracture presence  -     DXA Bone Density Axial Skeleton 1 or more sites; Future; Expected date: 06/20/2024  -     CBC Auto Differential; Future; Expected date: 12/20/2024  -     Vitamin D; Future; Expected date: 12/20/2024    4. Hyperlipidemia, unspecified hyperlipidemia type  Assessment & Plan:  Patient with known ASCVD advised on daily dosing of Crestor if unable to tolerate she needs to let me know and we can try a lower intensity statin.  Recheck lipids in  6 months with goal LDL of less than 70    Orders:  -     Comprehensive Metabolic Panel; Future; Expected date: 12/20/2024  -     Lipid Panel; Future; Expected date: 12/20/2024    5. Malignant neoplasm of female breast, unspecified estrogen receptor status, unspecified laterality, unspecified site of breast  -     CBC Auto Differential; Future; Expected date: 12/20/2024    6. Age-related osteoporosis without current pathological fracture  Assessment & Plan:  Continue calcium and vitamin-D, Rx Risedronate 150 mg Q monthly sent to pharmacy  Recheck labs in 6 months  DEXA in 12 months      7. Malignant neoplasm of upper-outer quadrant of left breast in female, estrogen receptor positive  Assessment & Plan:  Followed by Medical Oncology now off of hormone blockade      8. GERD without esophagitis  Assessment & Plan:  Taking Protonix Monday Wednesday Friday      Other orders  -     risedronate (ACTONEL) 150 MG Tab; Take 1 tablet (150 mg total) by mouth every 30 days.  Dispense: 3 tablet; Refill: 3         The following assessments were completed and reviewed. See completed screening forms and assessments within the Encounter Summary.   [x] Health Risk Assessment   [x] CVD Risk Factors   [x] Obesity/Physical Activity -  Encouraged daily 30 minute physical activity x 5 days per week.   [x] Home Safety/Living Situation   [x] Alcohol Screen  [x] Depression (PHQ) Screen   [x] Timed Get Up and Go   [x] Whisper Test  [x] Cognitive Function/Impairment Screen   [x] Nutrition Screening  [x] ADL Screen   [x] Opioid Screen:  [x] Patient does not have a prescription for opioids.   [] Patient has a prescription for opioids but is at low risk for abuse.   [x] Substance Abuse Screen:   [x] Patient does not use illicit substances.   [] Patient screens positive for substance use disorder.   Advance Care Planning   Advance Care Planning     Date: 06/20/2024  Patient did not wish or was not able to name a surrogate decision maker or provide  an Advance Care Plan.       I attest to discussing Advance Care Planning with patient and/or family member.  Education was provided including the importance of the Health Care Power of , Advance Directives, and/or [place] documentation.  The patient expressed understanding to the importance of this information and discussion.       Provided patient with a 5-10 year written screening schedule and personal prevention plan. Recommendations were developed using the USPSTF age appropriate recommendations. Education, counseling, and referrals were provided as needed. After Visit Summary printed and given to patient, which includes a list of additional screenings\tests needed.    Follow up in about 6 months (around 12/20/2024) for with labs prior to visit. In addition to their scheduled follow up, the patient has also been instructed to follow up on as needed basis.     Future Appointments   Date Time Provider Department Center   1/9/2025  2:20 PM John Egan MD [id number]   5/13/2025  3:10 [facility]   5/20/2025  1:00 PM [facility]        John Egan MD

## 2024-07-02 DIAGNOSIS — M81.0 OSTEOPOROSIS, UNSPECIFIED OSTEOPOROSIS TYPE, UNSPECIFIED PATHOLOGICAL FRACTURE PRESENCE: Primary | ICD-10-CM

## 2024-07-02 RX ORDER — RISEDRONATE SODIUM 150 MG/1
150 TABLET, FILM COATED ORAL
Qty: 3 TABLET | Refills: 3 | Status: SHIPPED | OUTPATIENT
Start: 2024-07-02 | End: 2025-07-02

## 2024-07-23 LAB — CRC RECOMMENDATION EXT: NORMAL

## 2024-07-30 ENCOUNTER — DOCUMENTATION ONLY (OUTPATIENT)
Dept: INTERNAL MEDICINE | Facility: CLINIC | Age: 69
End: 2024-07-30
Payer: MEDICARE

## 2024-09-23 PROBLEM — Z00.00 MEDICARE ANNUAL WELLNESS VISIT, SUBSEQUENT: Status: RESOLVED | Noted: 2024-06-20 | Resolved: 2024-09-23

## 2024-11-06 DIAGNOSIS — E78.5 HYPERLIPIDEMIA, UNSPECIFIED HYPERLIPIDEMIA TYPE: ICD-10-CM

## 2024-11-06 RX ORDER — ROSUVASTATIN CALCIUM 10 MG/1
TABLET, COATED ORAL
Qty: 90 TABLET | Refills: 4 | Status: SHIPPED | OUTPATIENT
Start: 2024-11-06

## 2024-12-26 ENCOUNTER — TELEPHONE (OUTPATIENT)
Dept: INTERNAL MEDICINE | Facility: CLINIC | Age: 69
End: 2024-12-26
Payer: MEDICARE

## 2024-12-26 NOTE — TELEPHONE ENCOUNTER
----- Message from Med Assistant Moss sent at 12/26/2024  8:51 AM CST -----  Regarding: PV 1/9/25 @ 2:20 Dr. Hills  1. Are there any outstanding tasks in the patient's chart? Yes, fasting labs    2. Is there any documentation in the chart? No    3.Has patient been seen in an ER, Urgent care clinic, or been admitted since last visit?  If yes, When, where, and why    4. Has patient seen any other healthcare providers since last visit?  If yes, when, where, and why    5. Has patient had any bloodwork or XR done since last visit?    6. Is patient signed up for patient portal?    7. Does patient have home blood pressure cuff?   If yes, please have patient bring to appointment for validation.

## 2025-01-02 ENCOUNTER — LAB VISIT (OUTPATIENT)
Dept: LAB | Facility: HOSPITAL | Age: 70
End: 2025-01-02
Attending: INTERNAL MEDICINE
Payer: MEDICARE

## 2025-01-02 DIAGNOSIS — M81.0 OSTEOPOROSIS, UNSPECIFIED OSTEOPOROSIS TYPE, UNSPECIFIED PATHOLOGICAL FRACTURE PRESENCE: ICD-10-CM

## 2025-01-02 DIAGNOSIS — E78.5 HYPERLIPIDEMIA, UNSPECIFIED HYPERLIPIDEMIA TYPE: ICD-10-CM

## 2025-01-02 DIAGNOSIS — C50.919 MALIGNANT NEOPLASM OF FEMALE BREAST, UNSPECIFIED ESTROGEN RECEPTOR STATUS, UNSPECIFIED LATERALITY, UNSPECIFIED SITE OF BREAST: ICD-10-CM

## 2025-01-02 LAB
25(OH)D3+25(OH)D2 SERPL-MCNC: >154 NG/ML (ref 30–80)
ALBUMIN SERPL-MCNC: 4.2 G/DL (ref 3.4–4.8)
ALBUMIN/GLOB SERPL: 1.6 RATIO (ref 1.1–2)
ALP SERPL-CCNC: 58 UNIT/L (ref 40–150)
ALT SERPL-CCNC: 24 UNIT/L (ref 0–55)
ANION GAP SERPL CALC-SCNC: 8 MEQ/L
AST SERPL-CCNC: 28 UNIT/L (ref 5–34)
BASOPHILS # BLD AUTO: 0.04 X10(3)/MCL
BASOPHILS NFR BLD AUTO: 0.7 %
BILIRUB SERPL-MCNC: 0.4 MG/DL
BUN SERPL-MCNC: 12.8 MG/DL (ref 9.8–20.1)
CALCIUM SERPL-MCNC: 10.1 MG/DL (ref 8.4–10.2)
CHLORIDE SERPL-SCNC: 107 MMOL/L (ref 98–107)
CHOLEST SERPL-MCNC: 230 MG/DL
CHOLEST/HDLC SERPL: 3 {RATIO} (ref 0–5)
CO2 SERPL-SCNC: 27 MMOL/L (ref 23–31)
CREAT SERPL-MCNC: 0.7 MG/DL (ref 0.55–1.02)
CREAT/UREA NIT SERPL: 18
EOSINOPHIL # BLD AUTO: 0.22 X10(3)/MCL (ref 0–0.9)
EOSINOPHIL NFR BLD AUTO: 4 %
ERYTHROCYTE [DISTWIDTH] IN BLOOD BY AUTOMATED COUNT: 12.9 % (ref 11.5–17)
GFR SERPLBLD CREATININE-BSD FMLA CKD-EPI: >60 ML/MIN/1.73/M2
GLOBULIN SER-MCNC: 2.7 GM/DL (ref 2.4–3.5)
GLUCOSE SERPL-MCNC: 92 MG/DL (ref 82–115)
HCT VFR BLD AUTO: 40.4 % (ref 37–47)
HDLC SERPL-MCNC: 77 MG/DL (ref 35–60)
HGB BLD-MCNC: 13.3 G/DL (ref 12–16)
IMM GRANULOCYTES # BLD AUTO: 0.03 X10(3)/MCL (ref 0–0.04)
IMM GRANULOCYTES NFR BLD AUTO: 0.5 %
LDLC SERPL CALC-MCNC: 98 MG/DL (ref 50–140)
LYMPHOCYTES # BLD AUTO: 1.09 X10(3)/MCL (ref 0.6–4.6)
LYMPHOCYTES NFR BLD AUTO: 19.7 %
MCH RBC QN AUTO: 31.9 PG (ref 27–31)
MCHC RBC AUTO-ENTMCNC: 32.9 G/DL (ref 33–36)
MCV RBC AUTO: 96.9 FL (ref 80–94)
MONOCYTES # BLD AUTO: 0.7 X10(3)/MCL (ref 0.1–1.3)
MONOCYTES NFR BLD AUTO: 12.7 %
NEUTROPHILS # BLD AUTO: 3.45 X10(3)/MCL (ref 2.1–9.2)
NEUTROPHILS NFR BLD AUTO: 62.4 %
NRBC BLD AUTO-RTO: 0 %
PLATELET # BLD AUTO: 260 X10(3)/MCL (ref 130–400)
PMV BLD AUTO: 10.3 FL (ref 7.4–10.4)
POTASSIUM SERPL-SCNC: 4.6 MMOL/L (ref 3.5–5.1)
PROT SERPL-MCNC: 6.9 GM/DL (ref 5.8–7.6)
RBC # BLD AUTO: 4.17 X10(6)/MCL (ref 4.2–5.4)
SODIUM SERPL-SCNC: 142 MMOL/L (ref 136–145)
TRIGL SERPL-MCNC: 274 MG/DL (ref 37–140)
VLDLC SERPL CALC-MCNC: 55 MG/DL
WBC # BLD AUTO: 5.53 X10(3)/MCL (ref 4.5–11.5)

## 2025-01-02 PROCEDURE — 80061 LIPID PANEL: CPT

## 2025-01-02 PROCEDURE — 85025 COMPLETE CBC W/AUTO DIFF WBC: CPT

## 2025-01-02 PROCEDURE — 80053 COMPREHEN METABOLIC PANEL: CPT

## 2025-01-02 PROCEDURE — 82306 VITAMIN D 25 HYDROXY: CPT

## 2025-01-02 PROCEDURE — 36415 COLL VENOUS BLD VENIPUNCTURE: CPT

## 2025-01-09 ENCOUNTER — OFFICE VISIT (OUTPATIENT)
Dept: INTERNAL MEDICINE | Facility: CLINIC | Age: 70
End: 2025-01-09
Payer: MEDICARE

## 2025-01-09 VITALS
HEART RATE: 91 BPM | BODY MASS INDEX: 27.29 KG/M2 | SYSTOLIC BLOOD PRESSURE: 124 MMHG | TEMPERATURE: 98 F | RESPIRATION RATE: 16 BRPM | HEIGHT: 60 IN | WEIGHT: 139 LBS | DIASTOLIC BLOOD PRESSURE: 78 MMHG | OXYGEN SATURATION: 96 %

## 2025-01-09 DIAGNOSIS — E78.2 MODERATE MIXED HYPERLIPIDEMIA NOT REQUIRING STATIN THERAPY: ICD-10-CM

## 2025-01-09 DIAGNOSIS — M81.0 AGE-RELATED OSTEOPOROSIS WITHOUT CURRENT PATHOLOGICAL FRACTURE: Primary | ICD-10-CM

## 2025-01-09 DIAGNOSIS — R19.7 DIARRHEA, UNSPECIFIED TYPE: ICD-10-CM

## 2025-01-09 DIAGNOSIS — Z17.0 MALIGNANT NEOPLASM OF UPPER-OUTER QUADRANT OF LEFT BREAST IN FEMALE, ESTROGEN RECEPTOR POSITIVE: ICD-10-CM

## 2025-01-09 DIAGNOSIS — C50.412 MALIGNANT NEOPLASM OF UPPER-OUTER QUADRANT OF LEFT BREAST IN FEMALE, ESTROGEN RECEPTOR POSITIVE: ICD-10-CM

## 2025-01-09 RX ORDER — DICLOFENAC SODIUM 10 MG/G
2 GEL TOPICAL 2 TIMES DAILY
Qty: 150 G | Refills: 3 | Status: SHIPPED | OUTPATIENT
Start: 2025-01-09

## 2025-01-09 NOTE — PROGRESS NOTES
Internal Medicine    Patient ID: [id number]     Chief Complaint: Hyperlipidemia (6 month f/u) and Osteoporosis (6 month f/u)      HPI:     Ann-Marie Perea is a 69 y.o. female here today for a follow up. Diarrhea, mostly in the morning since returning from a trip from [place] however she has a lot of fresh vegetables in the garden and has been eating a lot of CABG lots of gas and abdominal cramping.  Advised patient on increasing intake of dietary fiber and kind of avoiding those gaseous foods.  Bone density reviewed and a Osteoporosis is stable most negative T-score is at the right femoral neck at a -2.9  Cholesterol is stable LDL noted to be at 98    Past Medical History:   Diagnosis Date    Impingement syndrome of right shoulder     Malignant neoplasm of upper-outer quadrant of left female breast     Osteopenia     RLS (restless legs syndrome)     Venous insufficiency         Past Surgical History:   Procedure Laterality Date    BILATERAL MASTECTOMY  04/2017    BREAST BIOPSY Left     BREAST SURGERY  2017    CARPAL TUNNEL RELEASE  09/18/2019    CERVICAL FUSION  12/2020    COLONOSCOPY  06/27/2019    ENDOMETRIAL ABLATION      KNEE SURGERY      SPINE SURGERY  12/30/20        Social History     Tobacco Use    Smoking status: Former     Current packs/day: 1.00     Types: Cigarettes    Smokeless tobacco: Never    Tobacco comments:     Quit over 40 years ago   Substance and Sexual Activity    Alcohol use: Yes     Alcohol/week: 7.0 - 14.0 standard drinks of alcohol     Types: 7 - 14 Glasses of wine per week    Drug use: Never    Sexual activity: Yes     Partners: Male     Birth control/protection: None        Current Outpatient Medications   Medication Instructions    B. animalis-vitamin D3 1 billion cell- 10 mcg/5 drops Drop 2 drops, Daily    calcium-vitamin D 250 mg-2.5 mcg (100 unit) per tablet 1 tablet, Daily    co-enzyme Q-10 100 mg, Daily    cranberry fruit concentrate (AZO CRANBERRY ORAL) 1 tablet, Daily     diclofenac sodium (VOLTAREN) 2 g, Topical (Top), 2 times daily    fexofenadine (ALLEGRA) 180 mg, Nightly    fluticasone propionate (FLONASE) 50 mcg/actuation nasal spray 1 spray, Daily    glucosamine-chondroitin 500-400 mg tablet 2 tablets, Daily    multivit/folic acid/vit K1 (ONE-A-DAY WOMEN'S 50 PLUS ORAL) 1 tablet, Daily    omega-3 fatty acids/fish oil (FISH OIL-OMEGA-3 FATTY ACIDS) 300-1,000 mg capsule 1 capsule, Daily    pantoprazole (PROTONIX) 40 mg, Oral, Daily    risedronate (ACTONEL) 150 mg, Oral, Every 30 days    rosuvastatin (CRESTOR) 10 MG tablet TAKE 1 TABLET BY MOUTH EVERY DAY    sodium bicarb-sodium chloride (NASAMIST) 2.7 % SprA 2 sprays, Nightly    UNABLE TO FIND 2 drops, Daily    UNABLE TO FIND Collagen Peptides 1 scoop daily       Review of patient's allergies indicates:  No Known Allergies     Patient Care Team:  John Egan MD as PCP - General (Internal Medicine)     Subjective:     Review of Systems    12 point review of systems conducted, negative except as stated in the history of present illness. See HPI for details.    Objective:     Visit Vitals  /78 (BP Location: Right arm, Patient Position: Sitting)   Pulse 91   Temp 97.8 °F (36.6 °C) (Temporal)   Resp 16   Ht 5' (1.524 m)   Wt 63 kg (139 lb)   SpO2 96%   BMI 27.15 kg/m²       Physical Exam  Constitutional:       Appearance: Normal appearance.   HENT:      Head: Normocephalic and atraumatic.   Eyes:      Extraocular Movements: Extraocular movements intact.      Pupils: Pupils are equal, round, and reactive to light.   Cardiovascular:      Rate and Rhythm: Normal rate and regular rhythm.   Pulmonary:      Effort: Pulmonary effort is normal.      Breath sounds: Normal breath sounds.   Skin:     General: Skin is warm and dry.   Neurological:      General: No focal deficit present.      Mental Status: She is alert.   Psychiatric:         Mood and Affect: Mood normal.         Labs Reviewed:     Chemistry:  Lab Results    Component Value Date     01/02/2025    K 4.6 01/02/2025    BUN 12.8 01/02/2025    CREATININE 0.70 01/02/2025    EGFRNORACEVR >60 01/02/2025    GLUCOSE 92 01/02/2025    CALCIUM 10.1 01/02/2025    ALKPHOS 58 01/02/2025    LABPROT 6.9 01/02/2025    ALBUMIN 4.2 01/02/2025    BILIDIR 0.2 04/28/2022    IBILI 0.30 04/28/2022    AST 28 01/02/2025    ALT 24 01/02/2025    ONUZNBZC41CV >154 (H) 01/02/2025    TSH 0.743 06/12/2024        Lab Results   Component Value Date    HGBA1C 5.1 12/16/2020        Hematology:  Lab Results   Component Value Date    WBC 5.53 01/02/2025    HGB 13.3 01/02/2025    HCT 40.4 01/02/2025     01/02/2025       Lipid Panel:  Lab Results   Component Value Date    CHOL 230 (H) 01/02/2025    HDL 77 (H) 01/02/2025    LDL 98.00 01/02/2025    TRIG 274 (H) 01/02/2025    TOTALCHOLEST 3 01/02/2025        Urine:  Lab Results   Component Value Date    APPEARANCEUA Clear 06/12/2024    SGUA 1.014 06/12/2024    PROTEINUA Negative 06/12/2024    KETONESUA Negative 06/12/2024    LEUKOCYTESUR 250 (A) 06/12/2024    RBCUA 0-5 06/12/2024    WBCUA 0-5 06/12/2024    BACTERIA None Seen 06/12/2024    SQEPUA Trace 06/12/2024        Assessment:       ICD-10-CM ICD-9-CM   1. Age-related osteoporosis without current pathological fracture  M81.0 733.01   2. Moderate mixed hyperlipidemia not requiring statin therapy  E78.2 272.2   3. 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 Diarrhea, unspecified type  R19.7 787.91        Plan:     1. Age-related osteoporosis without current pathological fracture  Assessment & Plan:  Continue calcium and vitamin-D,  Risedronate 150 mg Q monthly sent to pharmacy  I did advise her to decrease her dose of vitamin-D a little bit since it is at 154, calcium is excellent at 10.1  Recheck labs in 6 months  DEXA in 6 months      2. Moderate mixed hyperlipidemia not requiring statin therapy  Assessment &  Plan:  Lab Results   Component Value Date    LDL 98.00 01/02/2025    TRIG 274 (H) 01/02/2025    HDL 77 (H) 01/02/2025    TOTALCHOLEST 3 01/02/2025     Continue  Stressed importance of dietary modifications. Follow a low cholesterol, low saturated fat diet with less that 200mg of cholesterol a day.  Avoid fried foods and high saturated fats (high saturated fats less than 7% of calories).  Add Flax Seed/Fish Oil supplements to diet. Increase dietary fiber.  Regular exercise can reduce LDL and raise HDL. Stressed importance of physical activity 5 times per week for 30 minutes per day.        3. Malignant neoplasm of upper-outer quadrant of left breast in female, estrogen receptor positive  Assessment & Plan:  Followed by Medical Oncology now off of hormone blockade      4. Diarrhea, unspecified type  Assessment & Plan:  Acute, less than 6 weeks.  Continue conservative treatment for now advised to switch to a bland diet.  Probably eliminate those fresh veggies for now.  Patient should call me if no improvement in we can do some stool studies      Other orders  -     diclofenac sodium (VOLTAREN) 1 % Gel; Apply 2 g topically 2 (two) times daily.  Dispense: 150 g; Refill: 3         Follow up in about 6 months (around 7/9/2025) for WELLNESS, with labs prior to visit. In addition to their scheduled follow up, the patient has also been instructed to follow up on as needed basis.     Future Appointments   Date Time Provider Department Center   5/14/2025 11:00 AM LAB, [facility] OLB LAB [facility]   5/21/2025  1:00 PM Lexa Olivera MD [facility] HEMONC [facility]   7/14/2025  2:20 PM John Egan MD [facility] [id number]        John Egan MD

## 2025-01-09 NOTE — ASSESSMENT & PLAN NOTE
Lab Results   Component Value Date    LDL 98.00 01/02/2025    TRIG 274 (H) 01/02/2025    HDL 77 (H) 01/02/2025    TOTALCHOLEST 3 01/02/2025     Continue  Stressed importance of dietary modifications. Follow a low cholesterol, low saturated fat diet with less that 200mg of cholesterol a day.  Avoid fried foods and high saturated fats (high saturated fats less than 7% of calories).  Add Flax Seed/Fish Oil supplements to diet. Increase dietary fiber.  Regular exercise can reduce LDL and raise HDL. Stressed importance of physical activity 5 times per week for 30 minutes per day.

## 2025-01-09 NOTE — ASSESSMENT & PLAN NOTE
Continue calcium and vitamin-D,  Risedronate 150 mg Q monthly sent to pharmacy  I did advise her to decrease her dose of vitamin-D a little bit since it is at 154, calcium is excellent at 10.1  Recheck labs in 6 months  DEXA in 6 months

## 2025-01-09 NOTE — ASSESSMENT & PLAN NOTE
Acute, less than 6 weeks.  Continue conservative treatment for now advised to switch to a bland diet.  Probably eliminate those fresh veggies for now.  Patient should call me if no improvement in we can do some stool studies

## 2025-01-24 ENCOUNTER — OFFICE VISIT (OUTPATIENT)
Dept: URGENT CARE | Facility: CLINIC | Age: 70
End: 2025-01-24
Payer: MEDICARE

## 2025-01-24 VITALS
WEIGHT: 139 LBS | SYSTOLIC BLOOD PRESSURE: 129 MMHG | TEMPERATURE: 98 F | HEIGHT: 60 IN | DIASTOLIC BLOOD PRESSURE: 69 MMHG | OXYGEN SATURATION: 98 % | BODY MASS INDEX: 27.29 KG/M2 | RESPIRATION RATE: 17 BRPM | HEART RATE: 95 BPM

## 2025-01-24 DIAGNOSIS — J32.9 SINUSITIS, UNSPECIFIED CHRONICITY, UNSPECIFIED LOCATION: Primary | ICD-10-CM

## 2025-01-24 DIAGNOSIS — R05.9 COUGH, UNSPECIFIED TYPE: ICD-10-CM

## 2025-01-24 DIAGNOSIS — U07.1 COVID: ICD-10-CM

## 2025-01-24 LAB
CTP QC/QA: YES
MOLECULAR STREP A: NEGATIVE
POC MOLECULAR INFLUENZA A AGN: NEGATIVE
POC MOLECULAR INFLUENZA B AGN: NEGATIVE
SARS-COV-2 AG RESP QL IA.RAPID: POSITIVE

## 2025-01-24 PROCEDURE — 87811 SARS-COV-2 COVID19 W/OPTIC: CPT | Mod: QW,,, | Performed by: FAMILY MEDICINE

## 2025-01-24 PROCEDURE — 96372 THER/PROPH/DIAG INJ SC/IM: CPT | Mod: ,,, | Performed by: FAMILY MEDICINE

## 2025-01-24 PROCEDURE — 87651 STREP A DNA AMP PROBE: CPT | Mod: QW,,, | Performed by: FAMILY MEDICINE

## 2025-01-24 PROCEDURE — 87502 INFLUENZA DNA AMP PROBE: CPT | Mod: QW,,, | Performed by: FAMILY MEDICINE

## 2025-01-24 PROCEDURE — 99214 OFFICE O/P EST MOD 30 MIN: CPT | Mod: 25,,, | Performed by: FAMILY MEDICINE

## 2025-01-24 RX ORDER — BETAMETHASONE SODIUM PHOSPHATE AND BETAMETHASONE ACETATE 3; 3 MG/ML; MG/ML
6 INJECTION, SUSPENSION INTRA-ARTICULAR; INTRALESIONAL; INTRAMUSCULAR; SOFT TISSUE
Status: COMPLETED | OUTPATIENT
Start: 2025-01-24 | End: 2025-01-24

## 2025-01-24 RX ORDER — AMOXICILLIN AND CLAVULANATE POTASSIUM 875; 125 MG/1; MG/1
1 TABLET, FILM COATED ORAL EVERY 12 HOURS
Qty: 14 TABLET | Refills: 0 | Status: SHIPPED | OUTPATIENT
Start: 2025-01-24 | End: 2025-01-31

## 2025-01-24 RX ADMIN — BETAMETHASONE SODIUM PHOSPHATE AND BETAMETHASONE ACETATE 6 MG: 3; 3 INJECTION, SUSPENSION INTRA-ARTICULAR; INTRALESIONAL; INTRAMUSCULAR; SOFT TISSUE at 10:01

## 2025-01-24 NOTE — PROGRESS NOTES
Subjective:      Patient ID: Ann-Marie Perea is a 69 y.o. female.    Vitals:  height is 5' (1.524 m) and weight is 63 kg (139 lb). Her temperature is 98.4 °F (36.9 °C). Her blood pressure is 129/69 and her pulse is 95. Her respiration is 17 and oxygen saturation is 98%.     Chief Complaint: Nasal Congestion     Patient is a 69 y.o. female who presents to urgent care with complaints of cough, congestion, chills, body aches, sore throat.  States the symptoms began 9 days ago.  States she thought she was improving but then began having thick heavy discolored nasal discharge with congestion.  Denies any shortness a breath vomiting or diarrhea      Constitution: Negative.   HENT:  Positive for congestion.    Cardiovascular: Negative.    Eyes: Negative.    Respiratory:  Positive for cough.    Gastrointestinal: Negative.    Genitourinary: Negative.    Musculoskeletal: Negative.    Skin: Negative.    Allergic/Immunologic: Negative.    Neurological: Negative.    Hematologic/Lymphatic: Negative.       Objective:     Physical Exam   Constitutional: She is oriented to person, place, and time. She appears well-developed. She is cooperative.  Non-toxic appearance. She does not appear ill. No distress.   HENT:   Head: Normocephalic and atraumatic.   Ears:   Right Ear: Hearing and external ear normal.   Left Ear: Hearing and external ear normal.   Mouth/Throat: Oropharynx is clear and moist and mucous membranes are normal. No oropharyngeal exudate or posterior oropharyngeal erythema (postnasal drip).   Eyes: Conjunctivae and lids are normal.   Neck: Trachea normal and phonation normal. Neck supple. No edema present. No erythema present. No neck rigidity present.   Cardiovascular: Normal rate.   Pulmonary/Chest: Effort normal and breath sounds normal. No stridor. No respiratory distress. She has no decreased breath sounds. She has no wheezes. She has no rhonchi. She has no rales.   Abdominal: Normal appearance.   Neurological:  She is alert and oriented to person, place, and time. She exhibits normal muscle tone. Coordination normal.   Skin: Skin is warm, dry, intact, not diaphoretic and no rash.   Psychiatric: Her speech is normal and behavior is normal. Mood, judgment and thought content normal.   Nursing note and vitals reviewed.         Previous History      Review of patient's allergies indicates:  No Known Allergies    Past Medical History:   Diagnosis Date    Impingement syndrome of right shoulder     Malignant neoplasm of upper-outer quadrant of left female breast     Osteopenia     RLS (restless legs syndrome)     Venous insufficiency      Current Outpatient Medications   Medication Instructions    amoxicillin-clavulanate 875-125mg (AUGMENTIN) 875-125 mg per tablet 1 tablet, Oral, Every 12 hours    B. animalis-vitamin D3 1 billion cell- 10 mcg/5 drops Drop 2 drops, Daily    calcium-vitamin D 250 mg-2.5 mcg (100 unit) per tablet 1 tablet, Daily    co-enzyme Q-10 100 mg, Daily    cranberry fruit concentrate (AZO CRANBERRY ORAL) 1 tablet, Daily    diclofenac sodium (VOLTAREN) 2 g, Topical (Top), 2 times daily    fexofenadine (ALLEGRA) 180 mg, Nightly    fluticasone propionate (FLONASE) 50 mcg/actuation nasal spray 1 spray, Daily    glucosamine-chondroitin 500-400 mg tablet 2 tablets, Daily    multivit/folic acid/vit K1 (ONE-A-DAY WOMEN'S 50 PLUS ORAL) 1 tablet, Daily    omega-3 fatty acids/fish oil (FISH OIL-OMEGA-3 FATTY ACIDS) 300-1,000 mg capsule 1 capsule, Daily    pantoprazole (PROTONIX) 40 mg, Oral, Daily    risedronate (ACTONEL) 150 mg, Oral, Every 30 days    rosuvastatin (CRESTOR) 10 MG tablet TAKE 1 TABLET BY MOUTH EVERY DAY    sodium bicarb-sodium chloride (NASAMIST) 2.7 % SprA 2 sprays, Nightly    UNABLE TO FIND 2 drops, Daily    UNABLE TO FIND Collagen Peptides 1 scoop daily     Past Surgical History:   Procedure Laterality Date    BILATERAL MASTECTOMY  04/2017    BREAST BIOPSY Left     BREAST SURGERY  2017    [facility]  TUNNEL RELEASE  09/18/2019    CERVICAL FUSION  12/2020    COLONOSCOPY  06/27/2019    ENDOMETRIAL ABLATION      KNEE SURGERY      SPINE SURGERY  12/30/20     Family History   Problem Relation Name Age of Onset    Stroke Mother Mother     Breast cancer Daughter  41    Breast cancer Maternal Cousin         Social History     Tobacco Use    Smoking status: Former     Current packs/day: 1.00     Types: Cigarettes     Passive exposure: Past    Smokeless tobacco: Never    Tobacco comments:     Quit over 40 years ago   Substance Use Topics    Alcohol use: Yes     Alcohol/week: 7.0 - 14.0 standard drinks of alcohol     Types: 7 - 14 Glasses of wine per week    Drug use: Never        Physical Exam      Vital Signs Reviewed   /69   Pulse 95   Temp 98.4 °F (36.9 °C)   Resp 17   Ht 5' (1.524 m)   Wt 63 kg (139 lb)   SpO2 98%   BMI 27.15 kg/m²        Procedures    Procedures     Labs     Results for orders placed or performed in visit on 01/24/25   SARS Coronavirus 2 Antigen, POCT Manual Read    Collection Time: 01/24/25  9:56 AM   Result Value Ref Range    SARS Coronavirus 2 Antigen Positive (A) Negative     Acceptable Yes    POCT Strep A, Molecular    Collection Time: 01/24/25 10:03 AM   Result Value Ref Range    Molecular Strep A, POC Negative Negative     Acceptable Yes        Assessment:     1. Sinusitis, unspecified chronicity, unspecified location    2. Cough, unspecified type    3. COVID        Plan:   Your COVID is positive.  I believe your illness began with COVID and now you have develop a secondary bacterial infection given you are on day 10 now.   Medications sent to pharmacy  Start taking an allergy pill daily such as claritin, zyrtec, allegrea or xyzal. Also start using a nasal steroid spray such as flonase or nasacort daily. If you are not being treated for high blood pressure, you can also take decongestant such as sudafed as needed. They can be purchased over the  counter. Monitor for fever. Take tylenol/acetaminophen or ibuprofen as needed. Rest and hydrate. If symptoms persist or worsen, return to clinic or seek medical attention immediately.       Sinusitis, unspecified chronicity, unspecified location    Cough, unspecified type  -     SARS Coronavirus 2 Antigen, POCT Manual Read  -     POCT Strep A, Molecular  -     POCT Influenza A/B Molecular    COVID    Other orders  -     amoxicillin-clavulanate 875-125mg (AUGMENTIN) 875-125 mg per tablet; Take 1 tablet by mouth every 12 (twelve) hours. for 7 days  Dispense: 14 tablet; Refill: 0  -     betamethasone acetate-betamethasone sodium phosphate injection 6 mg

## 2025-01-24 NOTE — PATIENT INSTRUCTIONS
Plan:   Your COVID is positive.  I believe your illness began with COVID and now you have develop a secondary bacterial infection given you are on day 10 now.   Medications sent to pharmacy  Start taking an allergy pill daily such as claritin, zyrtec, allegrea or xyzal. Also start using a nasal steroid spray such as flonase or nasacort daily. If you are not being treated for high blood pressure, you can also take decongestant such as sudafed as needed. They can be purchased over the counter. Monitor for fever. Take tylenol/acetaminophen or ibuprofen as needed. Rest and hydrate. If symptoms persist or worsen, return to clinic or seek medical attention immediately.

## 2025-03-17 ENCOUNTER — ANESTHESIA EVENT (OUTPATIENT)
Dept: SURGERY | Facility: HOSPITAL | Age: 70
DRG: 399 | End: 2025-03-17
Payer: MEDICARE

## 2025-03-17 ENCOUNTER — ANESTHESIA (OUTPATIENT)
Dept: SURGERY | Facility: HOSPITAL | Age: 70
DRG: 399 | End: 2025-03-17
Payer: MEDICARE

## 2025-03-17 ENCOUNTER — HOSPITAL ENCOUNTER (INPATIENT)
Facility: HOSPITAL | Age: 70
LOS: 1 days | Discharge: HOME | DRG: 399 | End: 2025-03-17
Attending: EMERGENCY MEDICINE | Admitting: SURGERY
Payer: MEDICARE

## 2025-03-17 VITALS
HEIGHT: 60 IN | DIASTOLIC BLOOD PRESSURE: 61 MMHG | TEMPERATURE: 98 F | WEIGHT: 125 LBS | HEART RATE: 82 BPM | OXYGEN SATURATION: 96 % | SYSTOLIC BLOOD PRESSURE: 115 MMHG | BODY MASS INDEX: 24.54 KG/M2 | RESPIRATION RATE: 16 BRPM

## 2025-03-17 DIAGNOSIS — K37 APPENDICITIS, UNSPECIFIED APPENDICITIS TYPE: Primary | ICD-10-CM

## 2025-03-17 LAB
ALBUMIN SERPL-MCNC: 4.1 G/DL (ref 3.4–4.8)
ALBUMIN/GLOB SERPL: 1.2 RATIO (ref 1.1–2)
ALP SERPL-CCNC: 49 UNIT/L (ref 40–150)
ALT SERPL-CCNC: 22 UNIT/L (ref 0–55)
ANION GAP SERPL CALC-SCNC: 9 MEQ/L
AST SERPL-CCNC: 23 UNIT/L (ref 5–34)
BACTERIA #/AREA URNS AUTO: ABNORMAL /HPF
BASOPHILS # BLD AUTO: 0.04 X10(3)/MCL
BASOPHILS NFR BLD AUTO: 0.2 %
BILIRUB SERPL-MCNC: 0.9 MG/DL
BILIRUB UR QL STRIP.AUTO: NEGATIVE
BUN SERPL-MCNC: 11.7 MG/DL (ref 9.8–20.1)
CALCIUM SERPL-MCNC: 10.1 MG/DL (ref 8.4–10.2)
CHLORIDE SERPL-SCNC: 103 MMOL/L (ref 98–107)
CLARITY UR: CLEAR
CO2 SERPL-SCNC: 23 MMOL/L (ref 23–31)
COLOR UR AUTO: ABNORMAL
CREAT SERPL-MCNC: 0.66 MG/DL (ref 0.55–1.02)
CREAT/UREA NIT SERPL: 18
EOSINOPHIL # BLD AUTO: 0 X10(3)/MCL (ref 0–0.9)
EOSINOPHIL NFR BLD AUTO: 0 %
ERYTHROCYTE [DISTWIDTH] IN BLOOD BY AUTOMATED COUNT: 13.3 % (ref 11.5–17)
GFR SERPLBLD CREATININE-BSD FMLA CKD-EPI: >60 ML/MIN/1.73/M2
GLOBULIN SER-MCNC: 3.3 GM/DL (ref 2.4–3.5)
GLUCOSE SERPL-MCNC: 130 MG/DL (ref 82–115)
GLUCOSE UR QL STRIP: NORMAL
HCT VFR BLD AUTO: 38.3 % (ref 37–47)
HGB BLD-MCNC: 13.3 G/DL (ref 12–16)
HGB UR QL STRIP: NEGATIVE
IMM GRANULOCYTES # BLD AUTO: 0.06 X10(3)/MCL (ref 0–0.04)
IMM GRANULOCYTES NFR BLD AUTO: 0.4 %
KETONES UR QL STRIP: NEGATIVE
LEUKOCYTE ESTERASE UR QL STRIP: 250
LIPASE SERPL-CCNC: 12 U/L
LYMPHOCYTES # BLD AUTO: 0.97 X10(3)/MCL (ref 0.6–4.6)
LYMPHOCYTES NFR BLD AUTO: 6 %
MAGNESIUM SERPL-MCNC: 1.9 MG/DL (ref 1.6–2.6)
MCH RBC QN AUTO: 32.1 PG (ref 27–31)
MCHC RBC AUTO-ENTMCNC: 34.7 G/DL (ref 33–36)
MCV RBC AUTO: 92.5 FL (ref 80–94)
MONOCYTES # BLD AUTO: 1.66 X10(3)/MCL (ref 0.1–1.3)
MONOCYTES NFR BLD AUTO: 10.3 %
MUCOUS THREADS URNS QL MICRO: ABNORMAL /LPF
NEUTROPHILS # BLD AUTO: 13.32 X10(3)/MCL (ref 2.1–9.2)
NEUTROPHILS NFR BLD AUTO: 83.1 %
NITRITE UR QL STRIP: NEGATIVE
NRBC BLD AUTO-RTO: 0 %
PH UR STRIP: 6 [PH]
PLATELET # BLD AUTO: 249 X10(3)/MCL (ref 130–400)
PMV BLD AUTO: 10.5 FL (ref 7.4–10.4)
POTASSIUM SERPL-SCNC: 3.5 MMOL/L (ref 3.5–5.1)
PROT SERPL-MCNC: 7.4 GM/DL (ref 5.8–7.6)
PROT UR QL STRIP: NEGATIVE
RBC # BLD AUTO: 4.14 X10(6)/MCL (ref 4.2–5.4)
RBC #/AREA URNS AUTO: ABNORMAL /HPF
SODIUM SERPL-SCNC: 135 MMOL/L (ref 136–145)
SP GR UR STRIP.AUTO: 1.01 (ref 1–1.03)
SQUAMOUS #/AREA URNS LPF: ABNORMAL /HPF
UROBILINOGEN UR STRIP-ACNC: NORMAL
WBC # BLD AUTO: 16.05 X10(3)/MCL (ref 4.5–11.5)
WBC #/AREA URNS AUTO: ABNORMAL /HPF

## 2025-03-17 PROCEDURE — 37000009 HC ANESTHESIA EA ADD 15 MINS: Performed by: SURGERY

## 2025-03-17 PROCEDURE — 11000001 HC ACUTE MED/SURG PRIVATE ROOM

## 2025-03-17 PROCEDURE — 83690 ASSAY OF LIPASE: CPT | Performed by: EMERGENCY MEDICINE

## 2025-03-17 PROCEDURE — 36000709 HC OR TIME LEV III EA ADD 15 MIN: Performed by: SURGERY

## 2025-03-17 PROCEDURE — 96376 TX/PRO/DX INJ SAME DRUG ADON: CPT

## 2025-03-17 PROCEDURE — 96375 TX/PRO/DX INJ NEW DRUG ADDON: CPT

## 2025-03-17 PROCEDURE — 99285 EMERGENCY DEPT VISIT HI MDM: CPT | Mod: 25

## 2025-03-17 PROCEDURE — 25000003 PHARM REV CODE 250: Performed by: ANESTHESIOLOGY

## 2025-03-17 PROCEDURE — 0DTJ4ZZ RESECTION OF APPENDIX, PERCUTANEOUS ENDOSCOPIC APPROACH: ICD-10-PCS | Performed by: SURGERY

## 2025-03-17 PROCEDURE — 25000003 PHARM REV CODE 250

## 2025-03-17 PROCEDURE — 81001 URINALYSIS AUTO W/SCOPE: CPT | Performed by: EMERGENCY MEDICINE

## 2025-03-17 PROCEDURE — 25500020 PHARM REV CODE 255: Performed by: EMERGENCY MEDICINE

## 2025-03-17 PROCEDURE — 71000039 HC RECOVERY, EACH ADD'L HOUR: Performed by: SURGERY

## 2025-03-17 PROCEDURE — 96361 HYDRATE IV INFUSION ADD-ON: CPT

## 2025-03-17 PROCEDURE — 71000033 HC RECOVERY, INTIAL HOUR: Performed by: SURGERY

## 2025-03-17 PROCEDURE — 63600175 PHARM REV CODE 636 W HCPCS: Performed by: ANESTHESIOLOGY

## 2025-03-17 PROCEDURE — 37000008 HC ANESTHESIA 1ST 15 MINUTES: Performed by: SURGERY

## 2025-03-17 PROCEDURE — 36000708 HC OR TIME LEV III 1ST 15 MIN: Performed by: SURGERY

## 2025-03-17 PROCEDURE — 87040 BLOOD CULTURE FOR BACTERIA: CPT | Performed by: EMERGENCY MEDICINE

## 2025-03-17 PROCEDURE — 63600175 PHARM REV CODE 636 W HCPCS

## 2025-03-17 PROCEDURE — 96365 THER/PROPH/DIAG IV INF INIT: CPT

## 2025-03-17 PROCEDURE — 85025 COMPLETE CBC W/AUTO DIFF WBC: CPT | Performed by: EMERGENCY MEDICINE

## 2025-03-17 PROCEDURE — 63600175 PHARM REV CODE 636 W HCPCS: Performed by: EMERGENCY MEDICINE

## 2025-03-17 PROCEDURE — 80053 COMPREHEN METABOLIC PANEL: CPT | Performed by: EMERGENCY MEDICINE

## 2025-03-17 PROCEDURE — 25000003 PHARM REV CODE 250: Performed by: EMERGENCY MEDICINE

## 2025-03-17 PROCEDURE — 71000015 HC POSTOP RECOV 1ST HR: Performed by: SURGERY

## 2025-03-17 PROCEDURE — 25000003 PHARM REV CODE 250: Performed by: NURSE ANESTHETIST, CERTIFIED REGISTERED

## 2025-03-17 PROCEDURE — 27000221 HC OXYGEN, UP TO 24 HOURS

## 2025-03-17 PROCEDURE — 25000003 PHARM REV CODE 250: Performed by: STUDENT IN AN ORGANIZED HEALTH CARE EDUCATION/TRAINING PROGRAM

## 2025-03-17 PROCEDURE — 87086 URINE CULTURE/COLONY COUNT: CPT | Performed by: EMERGENCY MEDICINE

## 2025-03-17 PROCEDURE — 88304 TISSUE EXAM BY PATHOLOGIST: CPT | Performed by: SURGERY

## 2025-03-17 PROCEDURE — 63600175 PHARM REV CODE 636 W HCPCS: Performed by: STUDENT IN AN ORGANIZED HEALTH CARE EDUCATION/TRAINING PROGRAM

## 2025-03-17 PROCEDURE — 27201423 OPTIME MED/SURG SUP & DEVICES STERILE SUPPLY: Performed by: SURGERY

## 2025-03-17 PROCEDURE — 83735 ASSAY OF MAGNESIUM: CPT | Performed by: EMERGENCY MEDICINE

## 2025-03-17 PROCEDURE — 25000003 PHARM REV CODE 250: Performed by: SURGERY

## 2025-03-17 PROCEDURE — 63600175 PHARM REV CODE 636 W HCPCS: Performed by: NURSE ANESTHETIST, CERTIFIED REGISTERED

## 2025-03-17 RX ORDER — SODIUM CHLORIDE, SODIUM LACTATE, POTASSIUM CHLORIDE, CALCIUM CHLORIDE 600; 310; 30; 20 MG/100ML; MG/100ML; MG/100ML; MG/100ML
INJECTION, SOLUTION INTRAVENOUS CONTINUOUS
Status: DISCONTINUED | OUTPATIENT
Start: 2025-03-17 | End: 2025-03-18 | Stop reason: HOSPADM

## 2025-03-17 RX ORDER — AMOXICILLIN AND CLAVULANATE POTASSIUM 500; 125 MG/1; MG/1
1 TABLET, FILM COATED ORAL 3 TIMES DAILY
Qty: 12 TABLET | Refills: 0 | Status: SHIPPED | OUTPATIENT
Start: 2025-03-17

## 2025-03-17 RX ORDER — TALC
6 POWDER (GRAM) TOPICAL NIGHTLY PRN
Status: DISCONTINUED | OUTPATIENT
Start: 2025-03-17 | End: 2025-03-18 | Stop reason: HOSPADM

## 2025-03-17 RX ORDER — OXYCODONE HYDROCHLORIDE 5 MG/1
5 TABLET ORAL EVERY 4 HOURS PRN
Refills: 0 | Status: DISCONTINUED | OUTPATIENT
Start: 2025-03-17 | End: 2025-03-18 | Stop reason: HOSPADM

## 2025-03-17 RX ORDER — ADHESIVE BANDAGE
30 BANDAGE TOPICAL DAILY PRN
Status: DISCONTINUED | OUTPATIENT
Start: 2025-03-17 | End: 2025-03-18 | Stop reason: HOSPADM

## 2025-03-17 RX ORDER — PROPOFOL 10 MG/ML
VIAL (ML) INTRAVENOUS
Status: DISCONTINUED | OUTPATIENT
Start: 2025-03-17 | End: 2025-03-17

## 2025-03-17 RX ORDER — ACETAMINOPHEN 325 MG/1
650 TABLET ORAL EVERY 4 HOURS
Status: DISCONTINUED | OUTPATIENT
Start: 2025-03-17 | End: 2025-03-18 | Stop reason: HOSPADM

## 2025-03-17 RX ORDER — FENTANYL CITRATE 50 UG/ML
INJECTION, SOLUTION INTRAMUSCULAR; INTRAVENOUS
Status: DISCONTINUED | OUTPATIENT
Start: 2025-03-17 | End: 2025-03-17

## 2025-03-17 RX ORDER — FAMOTIDINE 10 MG/ML
20 INJECTION, SOLUTION INTRAVENOUS ONCE
Status: COMPLETED | OUTPATIENT
Start: 2025-03-17 | End: 2025-03-17

## 2025-03-17 RX ORDER — PHENYLEPHRINE HCL IN 0.9% NACL 1 MG/10 ML
SYRINGE (ML) INTRAVENOUS
Status: DISCONTINUED | OUTPATIENT
Start: 2025-03-17 | End: 2025-03-17

## 2025-03-17 RX ORDER — OXYCODONE HYDROCHLORIDE 5 MG/1
5 TABLET ORAL EVERY 12 HOURS PRN
Qty: 14 TABLET | Refills: 0 | Status: SHIPPED | OUTPATIENT
Start: 2025-03-17

## 2025-03-17 RX ORDER — GLUCAGON 1 MG
1 KIT INJECTION
Status: DISCONTINUED | OUTPATIENT
Start: 2025-03-17 | End: 2025-03-17 | Stop reason: HOSPADM

## 2025-03-17 RX ORDER — METHOCARBAMOL 500 MG/1
500 TABLET, FILM COATED ORAL EVERY 8 HOURS
Status: DISCONTINUED | OUTPATIENT
Start: 2025-03-17 | End: 2025-03-18 | Stop reason: HOSPADM

## 2025-03-17 RX ORDER — LIDOCAINE HYDROCHLORIDE 20 MG/ML
INJECTION, SOLUTION EPIDURAL; INFILTRATION; INTRACAUDAL; PERINEURAL
Status: DISCONTINUED | OUTPATIENT
Start: 2025-03-17 | End: 2025-03-17

## 2025-03-17 RX ORDER — MORPHINE SULFATE 4 MG/ML
4 INJECTION, SOLUTION INTRAMUSCULAR; INTRAVENOUS
Refills: 0 | Status: COMPLETED | OUTPATIENT
Start: 2025-03-17 | End: 2025-03-17

## 2025-03-17 RX ORDER — GABAPENTIN 300 MG/1
300 CAPSULE ORAL 3 TIMES DAILY
Status: DISCONTINUED | OUTPATIENT
Start: 2025-03-17 | End: 2025-03-18 | Stop reason: HOSPADM

## 2025-03-17 RX ORDER — BUPIVACAINE HYDROCHLORIDE AND EPINEPHRINE 2.5; 5 MG/ML; UG/ML
INJECTION, SOLUTION EPIDURAL; INFILTRATION; INTRACAUDAL; PERINEURAL
Status: DISCONTINUED | OUTPATIENT
Start: 2025-03-17 | End: 2025-03-17 | Stop reason: HOSPADM

## 2025-03-17 RX ORDER — DEXAMETHASONE SODIUM PHOSPHATE 4 MG/ML
INJECTION, SOLUTION INTRA-ARTICULAR; INTRALESIONAL; INTRAMUSCULAR; INTRAVENOUS; SOFT TISSUE
Status: DISCONTINUED | OUTPATIENT
Start: 2025-03-17 | End: 2025-03-17

## 2025-03-17 RX ORDER — ENOXAPARIN SODIUM 100 MG/ML
40 INJECTION SUBCUTANEOUS EVERY 24 HOURS
Status: DISCONTINUED | OUTPATIENT
Start: 2025-03-17 | End: 2025-03-18 | Stop reason: HOSPADM

## 2025-03-17 RX ORDER — ONDANSETRON HYDROCHLORIDE 2 MG/ML
4 INJECTION, SOLUTION INTRAVENOUS
Status: COMPLETED | OUTPATIENT
Start: 2025-03-17 | End: 2025-03-17

## 2025-03-17 RX ORDER — POLYETHYLENE GLYCOL 3350 17 G/17G
17 POWDER, FOR SOLUTION ORAL 2 TIMES DAILY
Status: DISCONTINUED | OUTPATIENT
Start: 2025-03-17 | End: 2025-03-18 | Stop reason: HOSPADM

## 2025-03-17 RX ORDER — ONDANSETRON HYDROCHLORIDE 2 MG/ML
INJECTION, SOLUTION INTRAMUSCULAR; INTRAVENOUS
Status: DISCONTINUED | OUTPATIENT
Start: 2025-03-17 | End: 2025-03-17

## 2025-03-17 RX ORDER — MIDAZOLAM HYDROCHLORIDE 1 MG/ML
INJECTION INTRAMUSCULAR; INTRAVENOUS
Status: DISCONTINUED | OUTPATIENT
Start: 2025-03-17 | End: 2025-03-17

## 2025-03-17 RX ORDER — LIDOCAINE HYDROCHLORIDE 10 MG/ML
1 INJECTION, SOLUTION EPIDURAL; INFILTRATION; INTRACAUDAL; PERINEURAL ONCE
Status: DISCONTINUED | OUTPATIENT
Start: 2025-03-17 | End: 2025-03-17 | Stop reason: HOSPADM

## 2025-03-17 RX ORDER — MORPHINE SULFATE 4 MG/ML
2 INJECTION, SOLUTION INTRAMUSCULAR; INTRAVENOUS EVERY 5 MIN PRN
Status: DISCONTINUED | OUTPATIENT
Start: 2025-03-17 | End: 2025-03-17 | Stop reason: HOSPADM

## 2025-03-17 RX ORDER — SODIUM CHLORIDE, SODIUM LACTATE, POTASSIUM CHLORIDE, CALCIUM CHLORIDE 600; 310; 30; 20 MG/100ML; MG/100ML; MG/100ML; MG/100ML
1000 INJECTION, SOLUTION INTRAVENOUS
Status: DISCONTINUED | OUTPATIENT
Start: 2025-03-17 | End: 2025-03-17

## 2025-03-17 RX ORDER — PROCHLORPERAZINE EDISYLATE 5 MG/ML
5 INJECTION INTRAMUSCULAR; INTRAVENOUS EVERY 30 MIN PRN
Status: DISCONTINUED | OUTPATIENT
Start: 2025-03-17 | End: 2025-03-17 | Stop reason: HOSPADM

## 2025-03-17 RX ORDER — ROCURONIUM BROMIDE 10 MG/ML
INJECTION, SOLUTION INTRAVENOUS
Status: DISCONTINUED | OUTPATIENT
Start: 2025-03-17 | End: 2025-03-17

## 2025-03-17 RX ORDER — DOCUSATE SODIUM 100 MG/1
100 CAPSULE, LIQUID FILLED ORAL 2 TIMES DAILY
Status: DISCONTINUED | OUTPATIENT
Start: 2025-03-17 | End: 2025-03-18 | Stop reason: HOSPADM

## 2025-03-17 RX ADMIN — MORPHINE SULFATE 4 MG: 4 INJECTION INTRAVENOUS at 09:03

## 2025-03-17 RX ADMIN — ROCURONIUM BROMIDE 10 MG: 10 SOLUTION INTRAVENOUS at 05:03

## 2025-03-17 RX ADMIN — LIDOCAINE HYDROCHLORIDE 80 MG: 20 INJECTION, SOLUTION INTRAVENOUS at 04:03

## 2025-03-17 RX ADMIN — FAMOTIDINE 20 MG: 10 INJECTION, SOLUTION INTRAVENOUS at 04:03

## 2025-03-17 RX ADMIN — SODIUM CHLORIDE, POTASSIUM CHLORIDE, SODIUM LACTATE AND CALCIUM CHLORIDE: 600; 310; 30; 20 INJECTION, SOLUTION INTRAVENOUS at 09:03

## 2025-03-17 RX ADMIN — IOHEXOL 100 ML: 350 INJECTION, SOLUTION INTRAVENOUS at 08:03

## 2025-03-17 RX ADMIN — FENTANYL CITRATE 25 MCG: 50 INJECTION, SOLUTION INTRAMUSCULAR; INTRAVENOUS at 05:03

## 2025-03-17 RX ADMIN — ACETAMINOPHEN 650 MG: 325 TABLET, FILM COATED ORAL at 02:03

## 2025-03-17 RX ADMIN — POLYETHYLENE GLYCOL 3350 17 G: 17 POWDER, FOR SOLUTION ORAL at 09:03

## 2025-03-17 RX ADMIN — Medication 100 MCG: at 04:03

## 2025-03-17 RX ADMIN — FENTANYL CITRATE 50 MCG: 50 INJECTION, SOLUTION INTRAMUSCULAR; INTRAVENOUS at 04:03

## 2025-03-17 RX ADMIN — MIDAZOLAM HYDROCHLORIDE 2 MG: 1 INJECTION, SOLUTION INTRAMUSCULAR; INTRAVENOUS at 04:03

## 2025-03-17 RX ADMIN — METHOCARBAMOL 500 MG: 500 TABLET ORAL at 02:03

## 2025-03-17 RX ADMIN — MORPHINE SULFATE 2 MG: 4 INJECTION, SOLUTION INTRAMUSCULAR; INTRAVENOUS at 06:03

## 2025-03-17 RX ADMIN — ACETAMINOPHEN 650 MG: 325 TABLET, FILM COATED ORAL at 09:03

## 2025-03-17 RX ADMIN — MORPHINE SULFATE 4 MG: 4 INJECTION INTRAVENOUS at 06:03

## 2025-03-17 RX ADMIN — DEXAMETHASONE SODIUM PHOSPHATE 4 MG: 4 INJECTION, SOLUTION INTRA-ARTICULAR; INTRALESIONAL; INTRAMUSCULAR; INTRAVENOUS; SOFT TISSUE at 04:03

## 2025-03-17 RX ADMIN — ROCURONIUM BROMIDE 50 MG: 10 SOLUTION INTRAVENOUS at 04:03

## 2025-03-17 RX ADMIN — SUGAMMADEX 200 MG: 100 INJECTION, SOLUTION INTRAVENOUS at 05:03

## 2025-03-17 RX ADMIN — PIPERACILLIN AND TAZOBACTAM 4.5 G: 4; .5 INJECTION, POWDER, LYOPHILIZED, FOR SOLUTION INTRAVENOUS; PARENTERAL at 04:03

## 2025-03-17 RX ADMIN — PIPERACILLIN SODIUM AND TAZOBACTAM SODIUM 4.5 G: 4; .5 INJECTION, POWDER, LYOPHILIZED, FOR SOLUTION INTRAVENOUS at 09:03

## 2025-03-17 RX ADMIN — GABAPENTIN 300 MG: 300 CAPSULE ORAL at 02:03

## 2025-03-17 RX ADMIN — MORPHINE SULFATE 2 MG: 4 INJECTION, SOLUTION INTRAMUSCULAR; INTRAVENOUS at 07:03

## 2025-03-17 RX ADMIN — SODIUM CHLORIDE, SODIUM GLUCONATE, SODIUM ACETATE, POTASSIUM CHLORIDE AND MAGNESIUM CHLORIDE: 526; 502; 368; 37; 30 INJECTION, SOLUTION INTRAVENOUS at 04:03

## 2025-03-17 RX ADMIN — GABAPENTIN 300 MG: 300 CAPSULE ORAL at 09:03

## 2025-03-17 RX ADMIN — PROPOFOL 150 MG: 10 INJECTION, EMULSION INTRAVENOUS at 04:03

## 2025-03-17 RX ADMIN — ENOXAPARIN SODIUM 40 MG: 40 INJECTION SUBCUTANEOUS at 09:03

## 2025-03-17 RX ADMIN — ONDANSETRON 4 MG: 2 INJECTION INTRAMUSCULAR; INTRAVENOUS at 05:03

## 2025-03-17 RX ADMIN — DOCUSATE SODIUM 100 MG: 100 CAPSULE, LIQUID FILLED ORAL at 09:03

## 2025-03-17 RX ADMIN — SODIUM CHLORIDE, POTASSIUM CHLORIDE, SODIUM LACTATE AND CALCIUM CHLORIDE 1000 ML: 600; 310; 30; 20 INJECTION, SOLUTION INTRAVENOUS at 06:03

## 2025-03-17 RX ADMIN — ONDANSETRON 4 MG: 2 INJECTION INTRAMUSCULAR; INTRAVENOUS at 06:03

## 2025-03-17 NOTE — ED PROVIDER NOTES
"Encounter Date: 3/17/2025       History     Chief Complaint   Patient presents with    Abdominal Pain     Thinks she has a stomach virus, n/v/d since Sunday 0400, then Sunday night started with RLQ abd pain that she describes as crampy pain as well as "a pain pain", also compares pain to having a child.     This is a 70-year-old female who says that Sunday morning in the early morning she started to have some diarrhea nausea and vomiting.  This continued throughout the next 24 hours and she started having some pain mostly in the right lower quadrant of her abdomen.  Patient still has her appendix she has no sick contacts the last thing she ate was crawfish ettouffe her  had the same meal and is not sick.      Review of patient's allergies indicates:  No Known Allergies  Past Medical History:   Diagnosis Date    Impingement syndrome of right shoulder     Malignant neoplasm of upper-outer quadrant of left female breast     Osteopenia     RLS (restless legs syndrome)     Venous insufficiency      Past Surgical History:   Procedure Laterality Date    BILATERAL MASTECTOMY  04/2017    BREAST BIOPSY Left     BREAST SURGERY  2017    CARPAL TUNNEL RELEASE  09/18/2019    CERVICAL FUSION  12/2020    COLONOSCOPY  06/27/2019    ENDOMETRIAL ABLATION      KNEE SURGERY      SPINE SURGERY  12/30/20     Family History   Problem Relation Name Age of Onset    Stroke Mother Mother     Breast cancer Daughter  41    Breast cancer Maternal Cousin       Social History[1]  Review of Systems   Gastrointestinal:  Positive for abdominal pain, diarrhea, nausea and vomiting.       Physical Exam     Initial Vitals [03/17/25 0559]   BP Pulse Resp Temp SpO2   124/64 103 17 98.4 °F (36.9 °C) 98 %      MAP       --         Physical Exam    HENT:   Head: Normocephalic.   Eyes: EOM are normal. Right eye exhibits no discharge. Left eye exhibits no discharge. No scleral icterus.   Neck: Neck supple.   Cardiovascular:  Normal rate, regular rhythm " and normal heart sounds.           Pulmonary/Chest: Breath sounds normal. No stridor. No respiratory distress. She has no wheezes. She has no rhonchi. She has no rales.   Abdominal: She exhibits no distension. There is abdominal tenderness.   Rlq moderate ttp There is no rebound and no guarding.   Musculoskeletal:         General: No tenderness or edema. Normal range of motion.      Cervical back: Neck supple.     Neurological: She is alert and oriented to person, place, and time. She has normal strength.   Skin: Skin is dry. No rash noted. No erythema. No pallor.   Psychiatric: She has a normal mood and affect. Her behavior is normal. Judgment and thought content normal.         ED Course   Procedures  Labs Reviewed   COMPREHENSIVE METABOLIC PANEL - Abnormal       Result Value    Sodium 135 (*)     Potassium 3.5      Chloride 103      CO2 23      Glucose 130 (*)     Blood Urea Nitrogen 11.7      Creatinine 0.66      Calcium 10.1      Protein Total 7.4      Albumin 4.1      Globulin 3.3      Albumin/Globulin Ratio 1.2      Bilirubin Total 0.9      ALP 49      ALT 22      AST 23      eGFR >60      Anion Gap 9.0      BUN/Creatinine Ratio 18     CBC WITH DIFFERENTIAL - Abnormal    WBC 16.05 (*)     RBC 4.14 (*)     Hgb 13.3      Hct 38.3      MCV 92.5      MCH 32.1 (*)     MCHC 34.7      RDW 13.3      Platelet 249      MPV 10.5 (*)     Neut % 83.1      Lymph % 6.0      Mono % 10.3      Eos % 0.0      Basophil % 0.2      Imm Grans % 0.4      Neut # 13.32 (*)     Lymph # 0.97      Mono # 1.66 (*)     Eos # 0.00      Baso # 0.04      Imm Gran # 0.06 (*)     NRBC% 0.0     LIPASE - Normal    Lipase Level 12     MAGNESIUM - Normal    Magnesium Level 1.90     BLOOD CULTURE [facility]   BLOOD CULTURE [facility]   CBC W/ AUTO DIFFERENTIAL    Narrative:     The following orders were created for panel order CBC auto differential.  Procedure                               Abnormality         Status                     ---------                                -----------         ------                     CBC with Differential[[id number]]       Abnormal            Final result                 Please view results for these tests on the individual orders.   URINALYSIS, REFLEX TO URINE CULTURE          Imaging Results              CT Abdomen Pelvis With IV Contrast NO Oral Contrast (Final result)  Result time 03/17/25 08:14:32      Final result by Ashvin Miguel MD (03/17/25 08:14:32)                   Impression:      Acute appendicitis.  No pneumoperitoneum or abscess identified.    Report given to Dr. Elliott at the time of dictation.      Electronically signed by: Ashvin Miguel  Date:    03/17/2025  Time:    08:14               Narrative:    EXAMINATION:  CT ABDOMEN PELVIS WITH IV CONTRAST    CLINICAL HISTORY:  RLQ abdominal pain (Age >= 14y);    TECHNIQUE:  CT imaging of the abdomen and pelvis after intravenous contrast. Dose length product 330 mGycm. Automatic exposure control, adjustment of mA/kV or iterative reconstruction technique used to limit radiation dose.    COMPARISON:  PET-CT 04/03/2017    FINDINGS:  Liver/biliary: Few subcentimeter hepatic hypodensities too small to fully assess, but statistically benign.  No defined radiodense gallstones. No intra or extrahepatic biliary ductal dilation.    Pancreas: Normal.    Spleen: Normal.    Adrenals: Normal.    Genitourinary: Symmetric renal enhancement. No hydronephrosis. Bladder within normal limits. No pelvic mass.    Stomach/bowel: Cecum positioned along the midline with appendix also lying essentially along the midline.  Fluid-filled appendix measures 13 mm with mild periappendiceal stranding.    Lymph nodes/peritoneum: No pathologically enlarged lymph node identified. No pneumoperitoneum or drainable fluid collection.    Vasculature: Normal abdominal aortic caliber.    Abdominal wall: Small bilateral fat containing groin hernias.    Lung bases: No consolidation or pleural  effusion.    Musculoskeletal: No acute osseous findings. Right convex thoracolumbar scoliosis.                                       Medications   piperacillin-tazobactam (ZOSYN) 4.5 g in D5W 100 mL IVPB (MB+) (has no administration in time range)   morphine injection 4 mg (has no administration in time range)   lactated ringers bolus 1,000 mL (0 mLs Intravenous Stopped 3/17/25 0746)   morphine injection 4 mg (4 mg Intravenous Given 3/17/25 0644)   ondansetron injection 4 mg (4 mg Intravenous Given 3/17/25 0644)   iohexoL (OMNIPAQUE 350) injection 100 mL (100 mLs Intravenous Given 3/17/25 0800)     Medical Decision Making  Amount and/or Complexity of Data Reviewed  Labs: ordered.  Radiology: ordered.    Risk  Prescription drug management.               ED Course as of 03/17/25 0820   Mon Mar 17, 2025   0813 Gen surg paged   [NL]      ED Course User Index  [NL] Markell Elliott MD                           Clinical Impression:  Final diagnoses:  [K37] Appendicitis, unspecified appendicitis type (Primary)          ED Disposition Condition    Admit Stable                  [1]   Social History  Tobacco Use    Smoking status: Former     Current packs/day: 1.00     Types: Cigarettes     Passive exposure: Past    Smokeless tobacco: Never    Tobacco comments:     Quit over 40 years ago   Substance Use Topics    Alcohol use: Yes     Alcohol/week: 7.0 - 14.0 standard drinks of alcohol     Types: 7 - 14 Glasses of wine per week    Drug use: Never        Markell Elliott MD  03/17/25 0820

## 2025-03-17 NOTE — H&P
Acute Care Surgery   History and Physical     Patient Name: Ann-Marie Perea  YOB: 1955  Date: 03/17/2025 8:25 AM  Date of Admission: 3/17/2025  HD#0  POD#* No surgery found *    PRESENTING HISTORY   Chief Complaint/Reason for Admission: <principal problem not specified>  History source(s): patient, family, and chart   History of Present Illness:  Ann-Marie Perea is a 70-year-old female who presents to the ED with nausea, vomiting, diarrhea and abdominal pain.  Right lower quadrant abdominal pain onset yesterday.  Denies sick contacts.  Workup revealed acute appendicitis.  Denies abdominal surgery history.     Review of Systems:  12 point ROS negative except as stated in HPI    PAST HISTORY:   Past medical history:  Past Medical History:   Diagnosis Date    Impingement syndrome of right shoulder     Malignant neoplasm of upper-outer quadrant of left female breast     Osteopenia     RLS (restless legs syndrome)     Venous insufficiency        Past surgical history:  Past Surgical History:   Procedure Laterality Date    BILATERAL MASTECTOMY  04/2017    BREAST BIOPSY Left     BREAST SURGERY  2017    CARPAL TUNNEL RELEASE  09/18/2019    CERVICAL FUSION  12/2020    COLONOSCOPY  06/27/2019    ENDOMETRIAL ABLATION      KNEE SURGERY      SPINE SURGERY  12/30/20       Family history:  Family History   Problem Relation Name Age of Onset    Stroke Mother Mother     Breast cancer Daughter  41    Breast cancer Maternal Cousin         Social history:  Social History     Socioeconomic History    Marital status:    Occupational History    Occupation:  for 's law firm   Tobacco Use    Smoking status: Former     Current packs/day: 1.00     Types: Cigarettes     Passive exposure: Past    Smokeless tobacco: Never    Tobacco comments:     Quit over 40 years ago   Substance and Sexual Activity    Alcohol use: Yes     Alcohol/week: 7.0 - 14.0 standard drinks of alcohol     Types: 7 - 14  Glasses of wine per week    Drug use: Never    Sexual activity: Yes     Partners: Male     Birth control/protection: None     Social Drivers of Health     Financial Resource Strain: Low Risk  (6/19/2023)    Overall Financial Resource Strain (CARDIA)     Difficulty of Paying Living Expenses: Not hard at all   Food Insecurity: No Food Insecurity (1/9/2025)    Hunger Vital Sign     Worried About Running Out of Food in the Last Year: Never true     Ran Out of Food in the Last Year: Never true   Transportation Needs: No Transportation Needs (6/19/2023)    PRAPARE - Transportation     Lack of Transportation (Medical): No     Lack of Transportation (Non-Medical): No   Physical Activity: Sufficiently Active (6/19/2023)    Exercise Vital Sign     Days of Exercise per Week: 5 days     Minutes of Exercise per Session: 60 min   Recent Concern: Physical Activity - Insufficiently Active (4/4/2023)    Exercise Vital Sign     Days of Exercise per Week: 2 days     Minutes of Exercise per Session: 30 min   Stress: No Stress Concern Present (6/19/2023)    [employer] - Occupational Stress Questionnaire     Feeling of Stress : Not at all   Housing Stability: Unknown (1/9/2025)    Housing Stability Vital Sign     Unable to Pay for Housing in the Last Year: No     Homeless in the Last Year: No     Tobacco Use History[1]   Social History     Substance and Sexual Activity   Alcohol Use Yes    Alcohol/week: 7.0 - 14.0 standard drinks of alcohol    Types: 7 - 14 Glasses of wine per week        MEDICATIONS & ALLERGIES:     No current facility-administered medications on file prior to encounter.     Current Outpatient Medications on File Prior to Encounter   Medication Sig    B. animalis-vitamin D3 1 billion cell- 10 mcg/5 drops Drop Take 2 drops by mouth Daily.    calcium-vitamin D 250 mg-2.5 mcg (100 unit) per tablet Take 1 tablet by mouth once daily.    co-enzyme Q-10 30 mg capsule Take 100 mg by mouth once  daily.    cranberry fruit concentrate (AZO CRANBERRY ORAL) Take 1 tablet by mouth once daily.    diclofenac sodium (VOLTAREN) 1 % Gel Apply 2 g topically 2 (two) times daily.    fexofenadine (ALLEGRA) 180 MG tablet Take 180 mg by mouth nightly.    fluticasone propionate (FLONASE) 50 mcg/actuation nasal spray 1 spray by Each Nostril route once daily.    glucosamine-chondroitin 500-400 mg tablet Take 2 tablets by mouth once daily.    multivit/folic acid/vit K1 (ONE-A-DAY WOMEN'S 50 PLUS ORAL) Take 1 tablet by mouth once daily.    omega-3 fatty acids/fish oil (FISH OIL-OMEGA-3 FATTY ACIDS) 300-1,000 mg capsule Take 1 capsule by mouth once daily.    pantoprazole (PROTONIX) 40 MG tablet Take 1 tablet (40 mg total) by mouth once daily.    risedronate (ACTONEL) 150 MG Tab Take 1 tablet (150 mg total) by mouth every 30 days.    rosuvastatin (CRESTOR) 10 MG tablet TAKE 1 TABLET BY MOUTH EVERY DAY    sodium bicarb-sodium chloride (NASAMIST) 2.7 % SprA 2 sprays by Nasal route nightly.    UNABLE TO FIND 2 drops Daily. medication name: Hi-Po Emulsi - D3    UNABLE TO FIND Collagen Peptides 1 scoop daily     Allergies: Review of patient's allergies indicates:  No Known Allergies  Scheduled Meds:   lactated ringers  1,000 mL Intravenous ED 1 Time    morphine  4 mg Intravenous ED 1 Time    piperacillin-tazobactam (Zosyn) IV (PEDS and ADULTS) (extended infusion is not appropriate)  4.5 g Intravenous ED 1 Time     Continuous Infusions:  PRN Meds:    OBJECTIVE:   Vital Signs:  VITAL SIGNS: 24 HR MIN & MAX LAST   Temp  Min: 98.4 °F (36.9 °C)  Max: 98.4 °F (36.9 °C)  98.4 °F (36.9 °C)   BP  Min: 124/64  Max: 137/78  130/66    Pulse  Min: 88  Max: 103  88    Resp  Min: 15  Max: 20  19    SpO2  Min: 95 %  Max: 99 %  99 %      HT: 5' (152.4 cm)  WT: 56.7 kg (125 lb)  BMI: 24.4     Intake/output:  Intake/Output - Last 3 Shifts       None          No intake or output data in the 24 hours ending 03/17/25 0825      Physical Exam:  General: Well  "developed, well nourished, no acute distress  HEENT: Normocephalic, PERRL  CV: RR  Resp: NWOB  GI:  Abdomen soft, right lower quadrant and left lower quadrant tenderness to palpation, positive Rovsing sign, non-distended, no guarding, no rebound  :  Deferred  MSK: No muscle atrophy, cyanosis, peripheral edema, moving all extremities spontaneously  Skin/wounds:  No rashes, ulcers, erythema  Neuro:  CNII-XII grossly intact, alert and oriented to person, place, and time    Labs:  Troponin:  No results for input(s): "TROPONINI" in the last 72 hours.  CBC:  Recent Labs     03/17/25  0627   WBC 16.05*   RBC 4.14*   HGB 13.3   HCT 38.3      MCV 92.5   MCH 32.1*   MCHC 34.7     CMP:  Recent Labs     03/17/25  0627   CALCIUM 10.1   ALBUMIN 4.1   *   K 3.5   CO2 23      BUN 11.7   CREATININE 0.66   ALKPHOS 49   ALT 22   AST 23   BILITOT 0.9     Lactic Acid:  No results for input(s): "LACTATE" in the last 72 hours.  ETOH:  No results for input(s): "ETHANOL" in the last 72 hours.   Urine Drug Screen:  No results for input(s): "COCAINE", "OPIATE", "BARBITURATE", "AMPHETAMINE", "FENTANYL", "CANNABINOIDS", "MDMA" in the last 72 hours.    Invalid input(s): "BENZODIAZEPINE", "PHENCYCLIDINE"   ABG:  No results for input(s): "PH", "PO2", "PCO2", "HCO3", "BE" in the last 168 hours.   I have reviewed all pertinent lab results within the past 24 hours.    Diagnostic Results:  Imaging Results              CT Abdomen Pelvis With IV Contrast NO Oral Contrast (Final result)  Result time 03/17/25 08:14:32      Final result by Ashvin Miguel MD (03/17/25 08:14:32)                   Impression:      Acute appendicitis.  No pneumoperitoneum or abscess identified.    Report given to Dr. Elliott at the time of dictation.      Electronically signed by: Ashvin Miguel  Date:    03/17/2025  Time:    08:14               Narrative:    EXAMINATION:  CT ABDOMEN PELVIS WITH IV CONTRAST    CLINICAL HISTORY:  RLQ abdominal pain (Age " >= 14y);    TECHNIQUE:  CT imaging of the abdomen and pelvis after intravenous contrast. Dose length product 330 mGycm. Automatic exposure control, adjustment of mA/kV or iterative reconstruction technique used to limit radiation dose.    COMPARISON:  PET-CT 04/03/2017    FINDINGS:  Liver/biliary: Few subcentimeter hepatic hypodensities too small to fully assess, but statistically benign.  No defined radiodense gallstones. No intra or extrahepatic biliary ductal dilation.    Pancreas: Normal.    Spleen: Normal.    Adrenals: Normal.    Genitourinary: Symmetric renal enhancement. No hydronephrosis. Bladder within normal limits. No pelvic mass.    Stomach/bowel: Cecum positioned along the midline with appendix also lying essentially along the midline.  Fluid-filled appendix measures 13 mm with mild periappendiceal stranding.    Lymph nodes/peritoneum: No pathologically enlarged lymph node identified. No pneumoperitoneum or drainable fluid collection.    Vasculature: Normal abdominal aortic caliber.    Abdominal wall: Small bilateral fat containing groin hernias.    Lung bases: No consolidation or pleural effusion.    Musculoskeletal: No acute osseous findings. Right convex thoracolumbar scoliosis.                                     I have reviewed all pertinent imaging results/findings within the past 24 hours.    ASSESSMENT & PLAN:    Acute appendicitis     Admit to general surgery   NPO   Zosyn   Maintenance IV fluids   Lovenox   OR today for laparoscopic appendectomy  Risks and benefits discussed with patient at length.  All questions answered.         3/17/2025     The above findings, diagnostics, and treatment plan were discussed with Dr. Cam Tiwari  who will follow with further assessments and recommendations. Please call with any questions, concerns, or clinical status changes.  This note/OR report was created with the assistance of  voice recognition software or phone  dictation.  There may be  transcription errors as a result of using this technology however minimal. Effort has been made to assure accuracy of transcription but any obvious errors or omissions should be clarified with the author of the document.         [1]   Social History  Tobacco Use   Smoking Status Former    Current packs/day: 1.00    Types: Cigarettes    Passive exposure: Past   Smokeless Tobacco Never   Tobacco Comments    Quit over 40 years ago

## 2025-03-17 NOTE — BRIEF OP NOTE
[facility] - Periop Services  Brief Operative Note    SUMMARY     Surgery Date: 3/17/2025     Surgeons and Role:     * Cam Tiwari MD - Primary    Resident:   Nicole Radford MD PGY-4  Oswald Barrera MD PGY-1    Pre-op Diagnosis:  Appendicitis, unspecified appendicitis type [K37]    Post-op Diagnosis:  Post-Op Diagnosis Codes:     * Appendicitis, unspecified appendicitis type [K37]    Procedure(s) (LRB):  APPENDECTOMY, LAPAROSCOPIC (N/A)    Anesthesia: General    Implants:  * No implants in log *    Operative Findings: Acute appendicitis, not perforated    Estimated Blood Loss: Minimal         Specimens:   Specimen (24h ago, onward)       Start     Ordered    03/17/25 1740  Specimen to Pathology  RELEASE UPON ORDERING        References:    Click here for ordering Quick Tip   Question:  Release to patient  Answer:  Immediate    03/17/25 9832                    [id number]    Nicole Radford MD  [employer] General Surgery, PGY-4

## 2025-03-17 NOTE — ANESTHESIA PREPROCEDURE EVALUATION
03/17/2025  Ann-Marie Perea is a 70 y.o., female, who presents for the following:    Procedure: APPENDECTOMY, LAPAROSCOPIC (Abdomen)   Anesthesia type: General   Diagnosis: Appendicitis, unspecified appendicitis type [K37]   Pre-op diagnosis: Appendicitis, unspecified appendicitis type [K37]   Location: [facility] OR [facility] / [facility]   Surgeons: Cam Tiwari MD     HPI:  Ann-Marie Perea is a 70-year-old female who presents to the ED with nausea, vomiting, diarrhea and abdominal pain.  Right lower quadrant abdominal pain onset yesterday.  Denies sick contacts.  Workup revealed acute appendicitis.  Denies abdominal surgery history.     LAB:        Pre-op Assessment    I have reviewed the Patient Summary Reports.     I have reviewed the Nursing Notes. I have reviewed the NPO Status.   I have reviewed the Medications.     Review of Systems  Anesthesia Hx:  No problems with previous Anesthesia             Denies Family Hx of Anesthesia complications.    Denies Personal Hx of Anesthesia complications.                    Social:  Former Smoker       Hematology/Oncology:                                  Oncology Comments: Left Breast CA, s/p Bilat. Mastectomy     Cardiovascular:      Denies Hypertension.   Denies MI.        Denies Angina.  Denies CHF.    hyperlipidemia    Venous Insufficiency                           Pulmonary:  Pulmonary Normal   Denies COPD.  Denies Asthma.                    Renal/:   Denies Chronic Renal Disease. no renal calculi               Hepatic/GI:     GERD         Gerd          Musculoskeletal:     Hx cervical fusion       Spine Disorders: cervical            Neurological:    Denies CVA.    Denies Seizures.    RLS                            Endocrine:  Denies Diabetes. Denies Hypothyroidism.  Denies Hyperthyroidism.       Denies Obesity / BMI > 30      Physical Exam  General:  Alert, Oriented, Well nourished and Cooperative    Airway:  Mallampati: I   Mouth Opening: Normal  TM Distance: Normal  Tongue: Normal  Neck ROM: Normal ROM    Dental:  Intact    Chest/Lungs:  Normal Respiratory Rate    Heart:  Rate: Normal  Rhythm: Regular Rhythm      Anesthesia Plan  Type of Anesthesia, risks & benefits discussed:    Anesthesia Type: Gen ETT  Intra-op Monitoring Plan: Standard ASA Monitors  Post Op Pain Control Plan: IV/PO Opioids PRN and multimodal analgesia  Induction:  IV and rapid sequence  Airway Plan: Direct  Informed Consent: Informed consent signed with the Patient and all parties understand the risks and agree with anesthesia plan.  All questions answered. Patient consented to blood products? No  ASA Score: 2  Day of Surgery Review of History & Physical: H&P Update referred to the surgeon/provider.  Anesthesia Plan Notes:   Technique: GETA   PONV Prophylaxis   PACU Postop       Ready For Surgery From Anesthesia Perspective.     .

## 2025-03-17 NOTE — TRANSFER OF CARE
Anesthesia Transfer of Care Note    Patient: Ann-Marie Perea    Procedure(s) Performed: Procedure(s) (LRB):  APPENDECTOMY, LAPAROSCOPIC (N/A)    Patient location: PACU    Anesthesia Type: general    Transport from OR: Transported from OR on room air with adequate spontaneous ventilation    Post pain: adequate analgesia    Post assessment: no apparent anesthetic complications and tolerated procedure well    Post vital signs: stable    Level of consciousness: awake and alert    Nausea/Vomiting: no nausea/vomiting    Complications: none    Transfer of care protocol was followed      Last vitals: Visit Vitals  /75   Pulse 79   Temp 36.9 °C (98.4 °F) (Oral)   Resp (!) 22   Ht 5' (1.524 m)   Wt 56.7 kg (125 lb)   SpO2 97%   BMI 24.41 kg/m²

## 2025-03-17 NOTE — ANESTHESIA PROCEDURE NOTES
Intubation    Date/Time: 3/17/2025 4:39 PM    Performed by: Erika Gregorio CRNA  Authorized by: Reymundo Pak DO    Intubation:     Induction:  Intravenous    Intubated:  Postinduction    Mask Ventilation:  Easy mask    Attempts:  1    Attempted By:  CRNA    Method of Intubation:  Direct    Blade:  Cruz 2    Laryngeal View Grade: Grade I - full view of cords      Difficult Airway Encountered?: No      Complications:  None    Airway Device:  Oral endotracheal tube    Airway Device Size:  7.0    Style/Cuff Inflation:  Cuffed    Inflation Amount (mL):  6    Tube secured:  21    Secured at:  The lips    Placement Verified By:  Capnometry    Complicating Factors:  None    Findings Post-Intubation:  BS equal bilateral and atraumatic/condition of teeth unchanged

## 2025-03-18 ENCOUNTER — PATIENT OUTREACH (OUTPATIENT)
Dept: ADMINISTRATIVE | Facility: CLINIC | Age: 70
End: 2025-03-18
Payer: MEDICARE

## 2025-03-18 NOTE — ANESTHESIA POSTPROCEDURE EVALUATION
Anesthesia Post Evaluation    Patient: Ann-Marie Perea    Procedure(s) Performed: Procedure(s) (LRB):  APPENDECTOMY, LAPAROSCOPIC (N/A)    Final Anesthesia Type: general      Patient location during evaluation: PACU  Patient participation: Yes- Able to Participate  Level of consciousness: awake and alert  Post-procedure vital signs: reviewed and stable  Pain management: adequate  Airway patency: patent    PONV status at discharge: No PONV  Anesthetic complications: no      Cardiovascular status: blood pressure returned to baseline  Respiratory status: unassisted and spontaneous ventilation  Hydration status: euvolemic  Follow-up needed               Vitals Value Taken Time   /63 03/17/25 19:41   Temp 36.3 °C (97.3 °F) 03/17/25 18:06   Pulse 75 03/17/25 19:43   Resp 11 03/17/25 19:43   SpO2 97 % 03/17/25 19:43   Vitals shown include unfiled device data.      No case tracking events are documented in the log.      Pain/Gladys Score: Pain Rating Prior to Med Admin: 6 (3/17/2025  6:46 PM)  Gladys Score: 9 (3/17/2025  7:30 PM)

## 2025-03-18 NOTE — DISCHARGE SUMMARY
[address]  General Surgery  Discharge Summary      Patient Name: Ann-Marie Perea  MRN: [id number]  Admission Date: 3/17/2025  Hospital Length of Stay: 0 days  Discharge Date and Time:  03/17/2025 9:13 PM  Attending Physician: Cam Tiwari MD   Discharging Provider: Sp Barrera MD  Primary Care Provider: John Egan MD    Procedure(s) (LRB):  APPENDECTOMY, LAPAROSCOPIC (N/A)      Indwelling Lines/Drains at time of discharge:   Lines/Drains/Airways       None                 Hospital Course:   70-year-old female who presents to the ED with nausea, vomiting, diarrhea and abdominal pain.  Right lower quadrant abdominal pain onset yesterday.  Denies sick contacts.  Workup revealed acute appendicitis.  Denies abdominal surgery history. Now s/p lap appy (03/17) w/ post-op course uncomplicated and discharge same day. On day of discharge, pain well controlled on oral medications. Tolerating regular diet w/o N/V. Ambulating to bathroom. Voiding. No chest pain, shortness of breath, or fever. Patient discharged w/ 14 Oxycodone 5 mg pills and 4 days of Augmentin. She is to follow-up in General Surgery [facility] in 2 weeks.    Goals of Care Treatment Preferences:  Code Status: Full Code    Consults: none    Significant Diagnostic Studies: labs and imaging    Pending Diagnostic Studies:       Procedure Component Value Units Date/Time    Specimen to Pathology [[id number]] Collected: 03/17/25 1740    Order Status: Sent Lab Status: No result     Specimen: Tissue from Appendix           Final Active Diagnoses:    Diagnosis Date Noted POA    PRINCIPAL PROBLEM:  Appendicitis [K37] 03/17/2025 Yes      Problems Resolved During this Admission:      Discharged Condition: good    Disposition: Home or Self Care    Follow Up:   Follow-up Information       Clinic, Acute Care Surgery Follow up in 2 week(s).    Contact information:  [address]  [facility]  [address]  [phone]                           Patient Instructions:      Diet Adult Regular     Notify your health care provider if you experience any of the following:  temperature >100.4     Notify your health care provider if you experience any of the following:  persistent nausea and vomiting or diarrhea     Notify your health care provider if you experience any of the following:  severe uncontrolled pain     Notify your health care provider if you experience any of the following:  redness, tenderness, or signs of infection (pain, swelling, redness, odor or green/yellow discharge around incision site)     No dressing needed   Order Comments: Ok to let soap and water wash over wounds, do not submerge wounds in water for 2 weeks     Weight bearing restrictions (specify):   Order Comments: No lifting over 5 pounds for 2 weeks     Medications:  Patient discharged w/ 14 Oxycodone 5 mg pills and 4 days of Augmentin    Time spent on the discharge of patient: 10 minutes      Sp Barrera MD  [employer] General Surgery PGY1

## 2025-03-19 ENCOUNTER — TELEPHONE (OUTPATIENT)
Facility: CLINIC | Age: 70
End: 2025-03-19
Payer: MEDICARE

## 2025-03-19 LAB — BACTERIA UR CULT: ABNORMAL

## 2025-03-20 LAB — PSYCHE PATHOLOGY RESULT: NORMAL

## 2025-03-22 LAB
BACTERIA BLD CULT: NORMAL
BACTERIA BLD CULT: NORMAL

## 2025-04-01 ENCOUNTER — OFFICE VISIT (OUTPATIENT)
Dept: SURGERY | Facility: CLINIC | Age: 70
End: 2025-04-01
Payer: MEDICARE

## 2025-04-01 DIAGNOSIS — Z90.49 S/P LAPAROSCOPIC APPENDECTOMY: Primary | ICD-10-CM

## 2025-04-01 NOTE — PROGRESS NOTES
Televisit Note     Date of surgery: March 17, 2025  Procedure: lap appy  Surgeon: Dr. Cam Tiwari   Patient location: home  Chief Complaint: post op evaluation  Visit type: audiovisual    Subjective:  Ann-Marie Perea is doing well. Reports her incision sites have healed up. Tolerating a diet. Denies fever, chills, nausea, vomiting. Having bowel movements. Reports intermittent diarrhea. Denies sick contacts. She wants to be worked up for RENE. Pathology reviewed. Pathology report (see below) was discussed with the patient. Post-op care discussed. All questions were answered. Recommended to call our clinic as needed with any concerns.     Past medical history:  Past Medical History:   Diagnosis Date    Impingement syndrome of right shoulder     Malignant neoplasm of upper-outer quadrant of left female breast     Osteopenia     RLS (restless legs syndrome)     Venous insufficiency      Past Medical History:   Diagnosis Date    Impingement syndrome of right shoulder     Malignant neoplasm of upper-outer quadrant of left female breast     Osteopenia     RLS (restless legs syndrome)     Venous insufficiency      Past surgical history:  Past Surgical History:   Procedure Laterality Date    BILATERAL MASTECTOMY  04/2017    BREAST BIOPSY Left     BREAST SURGERY  2017    CARPAL TUNNEL RELEASE  09/18/2019    CERVICAL FUSION  12/2020    COLONOSCOPY  06/27/2019    ENDOMETRIAL ABLATION      KNEE SURGERY      LAPAROSCOPIC APPENDECTOMY N/A 3/17/2025    Procedure: APPENDECTOMY, LAPAROSCOPIC;  Surgeon: Cam Tiwari MD;  Location: [facility] OR;  Service: General;  Laterality: N/A;    SPINE SURGERY  12/30/20     Family history:  Family History   Problem Relation Name Age of Onset    Stroke Mother Mother     Breast cancer Daughter  41    Breast cancer Maternal Cousin       Social history:  Social History     Socioeconomic History    Marital status:    Occupational History    Occupation:  for  's law firm   Tobacco Use    Smoking status: Former     Current packs/day: 1.00     Types: Cigarettes     Passive exposure: Past    Smokeless tobacco: Never    Tobacco comments:     Quit over 40 years ago   Substance and Sexual Activity    Alcohol use: Yes     Alcohol/week: 7.0 - 14.0 standard drinks of alcohol     Types: 7 - 14 Glasses of wine per week    Drug use: Never    Sexual activity: Yes     Partners: Male     Birth control/protection: None     Social Drivers of Health     Financial Resource Strain: Low Risk  (3/27/2025)    Overall Financial Resource Strain (CARDIA)     Difficulty of Paying Living Expenses: Not hard at all   Food Insecurity: No Food Insecurity (3/27/2025)    Hunger Vital Sign     Worried About Running Out of Food in the Last Year: Never true     Ran Out of Food in the Last Year: Never true   Transportation Needs: No Transportation Needs (3/27/2025)    PRAPARE - Transportation     Lack of Transportation (Medical): No     Lack of Transportation (Non-Medical): No   Physical Activity: Sufficiently Active (3/27/2025)    Exercise Vital Sign     Days of Exercise per Week: 5 days     Minutes of Exercise per Session: 60 min   Stress: No Stress Concern Present (3/27/2025)    [employer] - Occupational Stress Questionnaire     Feeling of Stress : Only a little   Housing Stability: Low Risk  (3/27/2025)    Housing Stability Vital Sign     Unable to Pay for Housing in the Last Year: No     Number of Times Moved in the Last Year: 0     Homeless in the Last Year: No     Tobacco Use History[1]   Social History     Substance and Sexual Activity   Alcohol Use Yes    Alcohol/week: 7.0 - 14.0 standard drinks of alcohol    Types: 7 - 14 Glasses of wine per week      Allergies: Review of patient's allergies indicates:  No Known Allergies  Home Meds:   Current Outpatient Medications   Medication Instructions    amoxicillin-clavulanate 500-125mg (AUGMENTIN) 500-125 mg Tab 500 mg,  Oral, 3 times daily    B. animalis-vitamin D3 1 billion cell- 10 mcg/5 drops Drop 2 drops, Daily    calcium-vitamin D 250 mg-2.5 mcg (100 unit) per tablet 1 tablet, Daily    co-enzyme Q-10 100 mg, Daily    cranberry fruit concentrate (AZO CRANBERRY ORAL) 1 tablet, Daily    diclofenac sodium (VOLTAREN) 2 g, Topical (Top), 2 times daily    fexofenadine (ALLEGRA) 180 mg, Nightly    fluticasone propionate (FLONASE) 50 mcg/actuation nasal spray 1 spray, Daily    glucosamine-chondroitin 500-400 mg tablet 2 tablets, Daily    multivit/folic acid/vit K1 (ONE-A-DAY WOMEN'S 50 PLUS ORAL) 1 tablet, Daily    omega-3 fatty acids/fish oil (FISH OIL-OMEGA-3 FATTY ACIDS) 300-1,000 mg capsule 1 capsule, Daily    oxyCODONE (ROXICODONE) 5 mg, Oral, Every 12 hours PRN    pantoprazole (PROTONIX) 40 mg, Oral, Daily    risedronate (ACTONEL) 150 mg, Oral, Every 30 days    rosuvastatin (CRESTOR) 10 MG tablet TAKE 1 TABLET BY MOUTH EVERY DAY    sodium bicarb-sodium chloride (NASAMIST) 2.7 % SprA 2 sprays, Nightly    UNABLE TO FIND 2 drops, Daily    UNABLE TO FIND Collagen Peptides 1 scoop daily    Medications Ordered Prior to Encounter[2]   Current Outpatient Medications on File Prior to Visit   Medication Sig    amoxicillin-clavulanate 500-125mg (AUGMENTIN) 500-125 mg Tab Take 1 tablet (500 mg total) by mouth 3 (three) times daily.    B. animalis-vitamin D3 1 billion cell- 10 mcg/5 drops Drop Take 2 drops by mouth Daily.    calcium-vitamin D 250 mg-2.5 mcg (100 unit) per tablet Take 1 tablet by mouth once daily.    co-enzyme Q-10 30 mg capsule Take 100 mg by mouth once daily.    cranberry fruit concentrate (AZO CRANBERRY ORAL) Take 1 tablet by mouth once daily.    diclofenac sodium (VOLTAREN) 1 % Gel Apply 2 g topically 2 (two) times daily.    fexofenadine (ALLEGRA) 180 MG tablet Take 180 mg by mouth nightly.    fluticasone propionate (FLONASE) 50 mcg/actuation nasal spray 1 spray by Each Nostril route once daily.    glucosamine-chondroitin  500-400 mg tablet Take 2 tablets by mouth once daily.    multivit/folic acid/vit K1 (ONE-A-DAY WOMEN'S 50 PLUS ORAL) Take 1 tablet by mouth once daily.    omega-3 fatty acids/fish oil (FISH OIL-OMEGA-3 FATTY ACIDS) 300-1,000 mg capsule Take 1 capsule by mouth once daily.    oxyCODONE (ROXICODONE) 5 MG immediate release tablet Take 1 tablet (5 mg total) by mouth every 12 (twelve) hours as needed.    pantoprazole (PROTONIX) 40 MG tablet Take 1 tablet (40 mg total) by mouth once daily.    risedronate (ACTONEL) 150 MG Tab Take 1 tablet (150 mg total) by mouth every 30 days.    rosuvastatin (CRESTOR) 10 MG tablet TAKE 1 TABLET BY MOUTH EVERY DAY    sodium bicarb-sodium chloride (NASAMIST) 2.7 % SprA 2 sprays by Nasal route nightly.    UNABLE TO FIND 2 drops Daily. medication name: Hi-Po Emulsi - D3    UNABLE TO FIND Collagen Peptides 1 scoop daily     No current facility-administered medications on file prior to visit.      Outpatient Medications as of 4/1/2025   Medication Sig Dispense Refill    amoxicillin-clavulanate 500-125mg (AUGMENTIN) 500-125 mg Tab Take 1 tablet (500 mg total) by mouth 3 (three) times daily. 12 tablet 0    B. animalis-vitamin D3 1 billion cell- 10 mcg/5 drops Drop Take 2 drops by mouth Daily.      calcium-vitamin D 250 mg-2.5 mcg (100 unit) per tablet Take 1 tablet by mouth once daily.      co-enzyme Q-10 30 mg capsule Take 100 mg by mouth once daily.      cranberry fruit concentrate (AZO CRANBERRY ORAL) Take 1 tablet by mouth once daily.      diclofenac sodium (VOLTAREN) 1 % Gel Apply 2 g topically 2 (two) times daily. 150 g 3    fexofenadine (ALLEGRA) 180 MG tablet Take 180 mg by mouth nightly.      fluticasone propionate (FLONASE) 50 mcg/actuation nasal spray 1 spray by Each Nostril route once daily.      glucosamine-chondroitin 500-400 mg tablet Take 2 tablets by mouth once daily.      multivit/folic acid/vit K1 (ONE-A-DAY WOMEN'S 50 PLUS ORAL) Take 1 tablet by mouth once daily.      omega-3  fatty acids/fish oil (FISH OIL-OMEGA-3 FATTY ACIDS) 300-1,000 mg capsule Take 1 capsule by mouth once daily.      oxyCODONE (ROXICODONE) 5 MG immediate release tablet Take 1 tablet (5 mg total) by mouth every 12 (twelve) hours as needed. 14 tablet 0    pantoprazole (PROTONIX) 40 MG tablet Take 1 tablet (40 mg total) by mouth once daily. 90 tablet 1    risedronate (ACTONEL) 150 MG Tab Take 1 tablet (150 mg total) by mouth every 30 days. 3 tablet 3    rosuvastatin (CRESTOR) 10 MG tablet TAKE 1 TABLET BY MOUTH EVERY DAY 90 tablet 4    sodium bicarb-sodium chloride (NASAMIST) 2.7 % SprA 2 sprays by Nasal route nightly.      UNABLE TO FIND 2 drops Daily. medication name: Hi-Po Emulsi - D3      UNABLE TO FIND Collagen Peptides 1 scoop daily       No current facility-administered medications on file as of 4/1/2025.            Pathology:  Specimen to Pathology  Order: [id number]   Status: Final result       Next appt: 05/14/2025 at 11:00 AM in Lab (LAB, [facility])       Dx: Appendicitis, unspecified appendiciti...    Test Result Released: Yes (seen)    0 Result Notes      Component  Ref Range & Units (hover) 2 wk ago   Pathology Result    Comment:          FINAL DIAGNOSIS     APPENDIX, APPENDECTOMY:     SEVERE ACUTE APPENDICITIS WITH SEROSITIS.     Electronically Signed by:  Clara Luna M.D. , Pathologist  (Case signed 03/20/2025 at 09:27am)     Source  APPENDIX     Clinical Information  APPENDICITIS     Gross Description  Received in one container of formalin labeled `Ann-Marie Perea, appendix`  and accompanied by a requisition labeled `Ann-Marie Perea.`  Received is a  vermiform appendix with attached mesoappendix.  The vermiform appendix alone  measures 5.9 x 1.4 x 1.1 cm.  The serosa is tan.  An exudate is noted.  Sectioning reveals a dilated lumen in the mid portion of the specimen.  No  lesions, fecaliths or perforations are identified.  Representative sections are  submitted in cassettes 447 as  follows:     1A: Inked green proximal margin and entire distal tip  1B: Appendiceal wall     X-F-2     SW 3/18/2025     Microscopic Description  1. Microscopic examination performed.  Please see diagnosis.         Resulting Agency [facility]             Specimen Collected: 03/17/25 17:40 CDT Last Resulted: 03/20/25 00:00 CDT       Visit Diagnoses:  No diagnosis found.    Plan:  - ED precautions given  - Follow up PCP in 1-2 weeks  - Return PRN, no scheduled appointment needed. Call for concerns.    Future follow ups:  Future Appointments   Date Time Provider Department Center   5/14/2025 11:00 AM LAB, [facility] OLB LAB [facility]   5/21/2025  1:00 PM [facility]   8/14/2025  2:40 PM John Egan MD [facility] [id number]        4/1/2025     The above findings, diagnostics, and treatment plan were discussed with Dr. Cam Tiwari .  This note/OR report was created with the assistance of  voice recognition software or phone  dictation.  There may be transcription errors as a result of using this technology however minimal. Effort has been made to assure accuracy of transcription but any obvious errors or omissions should be clarified with the author of the document.    Face to Face time with patient: 10 minutes  10 minutes of total time spent on the encounter, which includes face to face time and non-face to face time preparing to see the patient (eg, review of tests), Obtaining and/or reviewing separately obtained history, Documenting clinical information in the electronic or other health record, Independently interpreting results (not separately reported) and communicating results to the patient/family/caregiver, or Care coordination (not separately reported).   Each patient to whom he or she provides medical services by telemedicine is:  (1) informed of the relationship between the physician and patient and the respective role of any other health care provider with respect to  management of the patient; and (2) notified that he or she may decline to receive medical services by telemedicine and may withdraw from such care at any time.          [1]   Social History  Tobacco Use   Smoking Status Former    Current packs/day: 1.00    Types: Cigarettes    Passive exposure: Past   Smokeless Tobacco Never   Tobacco Comments    Quit over 40 years ago   [2]   Current Outpatient Medications on File Prior to Visit   Medication Sig Dispense Refill    amoxicillin-clavulanate 500-125mg (AUGMENTIN) 500-125 mg Tab Take 1 tablet (500 mg total) by mouth 3 (three) times daily. 12 tablet 0    B. animalis-vitamin D3 1 billion cell- 10 mcg/5 drops Drop Take 2 drops by mouth Daily.      calcium-vitamin D 250 mg-2.5 mcg (100 unit) per tablet Take 1 tablet by mouth once daily.      co-enzyme Q-10 30 mg capsule Take 100 mg by mouth once daily.      cranberry fruit concentrate (AZO CRANBERRY ORAL) Take 1 tablet by mouth once daily.      diclofenac sodium (VOLTAREN) 1 % Gel Apply 2 g topically 2 (two) times daily. 150 g 3    fexofenadine (ALLEGRA) 180 MG tablet Take 180 mg by mouth nightly.      fluticasone propionate (FLONASE) 50 mcg/actuation nasal spray 1 spray by Each Nostril route once daily.      glucosamine-chondroitin 500-400 mg tablet Take 2 tablets by mouth once daily.      multivit/folic acid/vit K1 (ONE-A-DAY WOMEN'S 50 PLUS ORAL) Take 1 tablet by mouth once daily.      omega-3 fatty acids/fish oil (FISH OIL-OMEGA-3 FATTY ACIDS) 300-1,000 mg capsule Take 1 capsule by mouth once daily.      oxyCODONE (ROXICODONE) 5 MG immediate release tablet Take 1 tablet (5 mg total) by mouth every 12 (twelve) hours as needed. 14 tablet 0    pantoprazole (PROTONIX) 40 MG tablet Take 1 tablet (40 mg total) by mouth once daily. 90 tablet 1    risedronate (ACTONEL) 150 MG Tab Take 1 tablet (150 mg total) by mouth every 30 days. 3 tablet 3    rosuvastatin (CRESTOR) 10 MG tablet TAKE 1 TABLET BY MOUTH EVERY DAY 90 tablet 4     sodium bicarb-sodium chloride (NASAMIST) 2.7 % SprA 2 sprays by Nasal route nightly.      UNABLE TO FIND 2 drops Daily. medication name: Hi-Po Emulsi - D3      UNABLE TO FIND Collagen Peptides 1 scoop daily       No current facility-administered medications on file prior to visit.

## 2025-05-03 DIAGNOSIS — M17.11 PRIMARY OSTEOARTHRITIS OF RIGHT KNEE: Primary | ICD-10-CM

## 2025-05-05 RX ORDER — DICLOFENAC SODIUM 10 MG/G
2 GEL TOPICAL 2 TIMES DAILY
Qty: 100 G | Refills: 3 | Status: SHIPPED | OUTPATIENT
Start: 2025-05-05

## 2025-05-06 NOTE — PROGRESS NOTES
Subjective:       Patient ID: Ann-Marie Perea is a 70 y.o. female.    Chief Complaint:  I had appendicitis    Diagnosis: Multifocal stage IIA left breast invasive lobular carcinoma (T2 N0(i+) M0), 2.9/2.1 cm, GII, 1 IHC+ SN,                        ER/NC +, HER-2 negative, Oncotype RS 8                    S/p bilateral mastectomies                    Postmenopausal with intact uterus    Treatment History: Arimidex 6/17-12/22    Current Treatment: Observation    Clinical History:  Patient initially presented 3/17 with mild left nipple retraction on self examination. Her last mammogram had been 11/14. She had a history of a benign breast biopsy on the left side several years earlier. She was referred for a mammogram 3/21/17 showing 3 suspicious lesions in the left breast. 2 of these lesions were biopsied and positive for invasive lobular carcinoma. CT PET scan 4/3/17 showed mildly increased hypermetabolic activity in the left breast compared to the right but no other abnormal activity to suggest metastatic disease. She underwent bilateral mastectomies with placement of tissue expanders and left sentinel lymph node dissection 4/17/17. Right breast showed atypical lobular hyperplasia and sclerosing adenosis. Left breast showed 2 foci of grade II invasive lobular carcinoma measuring 2.9 cm at 3:00 and 2.1 cm at 8:00. [place] lymph node #1 showed isolated tumor cells by IHC. [place] lymph nodes # 2 and 3 were negative and 3 additional axillary nodes were also negative. ER +96%, NC +94% and HER-2 negative by FISH. She was seen postoperatively by Dr. Lee. Oncotype testing showed a low risk recurrence score of 8 which was equivalent to a 6% 10 year risk of recurrence in Tamoxifen treated patients. Adjuvant hormonal therapy was recommended with an aromatase inhibitor for 5-10 years. Baseline bone density exam 6/9/17 showed osteopenia of the lumbar spine with a T score of -1.8 and both hips with T-scores of -1.5 on  the left and -1.1 on the right. There was osteoporosis of the left femoral neck at -2.6. She was started on a calcium and vitamin D supplement. Treatment was discussed with Prolia but she had significant concerns regarding the side effect profile. Her postoperative course was complicated by development of an infected left tissue expander requiring removal and antibiotic therapy. She eventually required a left latissimus flap and was able to have her expander replaced.    [facility] to establish ongoing Oncology care due to the [facility] of Dr. Rivero. She was on Arimidex 1 mg daily. She still had bilateral tissue expanders in place. Her daughter had been diagnosed with breast cancer at the age of 41 in [place]. The patient tested negative for BRCA1 and BRCA2 mutations. When she was seen for follow-up 7/19/18, she complained of discomfort and thickening in the left axilla. She had no palpable masses on her clinical exam. Ultrasound of the left breast and axilla 7/25/18 showed benign appearing fibrofatty tissue with no masses, cyst or other abnormalities.  She completed breast reconstruction with implant placement and bilateral nipple tattooing.  Arimidex therapy was discontinued 12/22 after 5-1/2 years of treatment.    Interval History  She returns to clinic today by herself for an annual surveillance visit.  She underwent a laparoscopic appendectomy 3/17/25 for acute appendicitis.  She has recovered uneventfully.  She reports no other significant health issues in the past year.  She is taking Actonel for treatment of her bone density.  Last bone density exam 5/16/24 showed osteopenia of both hips with a T-score of-2.4 on the right and -1.6 on the left, not significantly changed from 04/22.  There was osteoporosis of both femoral necks with a T-score of -2.5 on the left and -2.9 on the right.  Left forearm T-score was -2.1.  She reports no changes on her self-breast  examination following bilateral mastectomies and reconstruction.  Laboratory testing for this visit showed no suspicious findings.      Review of Systems   Constitutional:  Negative for appetite change, fatigue, fever and unexpected weight change.   HENT:  Negative for mouth sores, sore throat and trouble swallowing.    Eyes: Negative.  Negative for visual disturbance.   Respiratory:  Negative for cough and shortness of breath.    Cardiovascular:  Negative for chest pain, palpitations and leg swelling.   Gastrointestinal:  Negative for abdominal distention, abdominal pain, constipation, diarrhea, nausea and vomiting.   Genitourinary:  Negative for dysuria, frequency and urgency.   Musculoskeletal:  Positive for arthralgias and back pain (Mild low back pain).   Integumentary:  Negative for pallor and rash.   Neurological:  Negative for dizziness, weakness, numbness and headaches.   Hematological:  Negative for adenopathy. Does not bruise/bleed easily.   Psychiatric/Behavioral: Negative.  The patient is not nervous/anxious.        PMHx:  Osteopenia, venous insufficiency, restless legs  Menarche age 13, first pregnancy 21, 3 children (+ breast fed), menopause early 50s, no HRT.  PSHx:  Bilateral mastectomies 4/17, endometrial ablation, left breast biopsy, cervical fusion 12/20, right TKR 4/23, appendectomy 3/25  SH:  Former smoker 1 PPD, quit 1980s. + Social alcohol use. Lives in [place] with her . Works as a  for her 's law firm.  FH:  Her daughter was diagnosed with breast cancer age 41. A maternal first cousin also had breast cancer.     Objective:        /80   Pulse 109   Temp 97.8 °F (36.6 °C)   Resp 15   Ht 5' (1.524 m)   Wt 62.6 kg (138 lb)   SpO2 98%   BMI 26.95 kg/m²    Physical Exam  Constitutional:       Comments: Well-developed white female in NAD   HENT:      Head: Normocephalic.      Mouth/Throat:      Mouth: Mucous membranes are moist.      Pharynx: Oropharynx is  clear. No posterior oropharyngeal erythema.   Eyes:      Extraocular Movements: Extraocular movements intact.      Conjunctiva/sclera: Conjunctivae normal.      Pupils: Pupils are equal, round, and reactive to light.   Neck:      Comments: Well-healed incision left anterior neck.  Cardiovascular:      Rate and Rhythm: Normal rate and regular rhythm.      Heart sounds: No murmur heard.  Pulmonary:      Comments: Lungs clear to auscultation.  Chest:      Comments: Bilateral mastectomies with implant reconstruction and left latissimus dorsi flap.  No suspicious masses, skin changes or axillary nodes bilaterally.  Abdominal:      General: Bowel sounds are normal. There is no distension.      Palpations: Abdomen is soft. There is no mass.      Tenderness: There is no abdominal tenderness.   Musculoskeletal:         General: No swelling or tenderness. Normal range of motion.      Cervical back: Neck supple. No tenderness.      Comments: Well-healed right knee incision   Lymphadenopathy:      Cervical: No cervical adenopathy.      Upper Body:      Right upper body: No supraclavicular or axillary adenopathy.      Left upper body: No supraclavicular or axillary adenopathy.   Skin:     General: Skin is warm and dry.      Findings: No rash.   Neurological:      General: No focal deficit present.      Mental Status: She is alert and oriented to person, place, and time.      Cranial Nerves: No cranial nerve deficit.      Motor: No weakness.       ECOG SCORE    0 - Fully active-able to carry on all pre-disease performance without restriction          LABORATORY  No results found for this or any previous visit (from the past week).      Assessment:   Multifocal stage IIA left breast invasive lobular carcinoma (T2 N0(i) M0) - [place]  Osteopenia/osteoporosis      Plan:   Patient has no suspicious findings on her clinical breast exam or surveillance laboratory.  Her bone density is being managed and treated by her primary care  physician.  RTC in 1 year for a follow-up visit and clinical exam.      DIONICIO ROMO MD    Other Physicians  Dr. John Miller

## 2025-05-14 ENCOUNTER — LAB VISIT (OUTPATIENT)
Dept: LAB | Facility: HOSPITAL | Age: 70
End: 2025-05-14
Attending: INTERNAL MEDICINE
Payer: MEDICARE

## 2025-05-14 DIAGNOSIS — Z17.0 MALIGNANT NEOPLASM OF UPPER-OUTER QUADRANT OF LEFT BREAST IN FEMALE, ESTROGEN RECEPTOR POSITIVE: ICD-10-CM

## 2025-05-14 DIAGNOSIS — C50.412 MALIGNANT NEOPLASM OF UPPER-OUTER QUADRANT OF LEFT BREAST IN FEMALE, ESTROGEN RECEPTOR POSITIVE: ICD-10-CM

## 2025-05-14 LAB
ALBUMIN SERPL-MCNC: 4.2 G/DL (ref 3.4–4.8)
ALBUMIN/GLOB SERPL: 1.2 RATIO (ref 1.1–2)
ALP SERPL-CCNC: 64 UNIT/L (ref 40–150)
ALT SERPL-CCNC: 25 UNIT/L (ref 0–55)
ANION GAP SERPL CALC-SCNC: 10 MEQ/L
AST SERPL-CCNC: 31 UNIT/L (ref 11–45)
BASOPHILS # BLD AUTO: 0.03 X10(3)/MCL
BASOPHILS NFR BLD AUTO: 0.6 %
BILIRUB SERPL-MCNC: 0.6 MG/DL
BUN SERPL-MCNC: 18.3 MG/DL (ref 9.8–20.1)
CALCIUM SERPL-MCNC: 10 MG/DL (ref 8.4–10.2)
CEA SERPL-MCNC: <1.73 NG/ML (ref 0–3)
CHLORIDE SERPL-SCNC: 106 MMOL/L (ref 98–107)
CO2 SERPL-SCNC: 24 MMOL/L (ref 23–31)
CREAT SERPL-MCNC: 0.67 MG/DL (ref 0.55–1.02)
CREAT/UREA NIT SERPL: 27
EOSINOPHIL # BLD AUTO: 0.18 X10(3)/MCL (ref 0–0.9)
EOSINOPHIL NFR BLD AUTO: 3.7 %
ERYTHROCYTE [DISTWIDTH] IN BLOOD BY AUTOMATED COUNT: 13.1 % (ref 11.5–17)
GFR SERPLBLD CREATININE-BSD FMLA CKD-EPI: >60 ML/MIN/1.73/M2
GLOBULIN SER-MCNC: 3.6 GM/DL (ref 2.4–3.5)
GLUCOSE SERPL-MCNC: 91 MG/DL (ref 82–115)
HCT VFR BLD AUTO: 39.7 % (ref 37–47)
HGB BLD-MCNC: 13.2 G/DL (ref 12–16)
IMM GRANULOCYTES # BLD AUTO: 0.01 X10(3)/MCL (ref 0–0.04)
IMM GRANULOCYTES NFR BLD AUTO: 0.2 %
LYMPHOCYTES # BLD AUTO: 1.44 X10(3)/MCL (ref 0.6–4.6)
LYMPHOCYTES NFR BLD AUTO: 29.9 %
MCH RBC QN AUTO: 31.9 PG (ref 27–31)
MCHC RBC AUTO-ENTMCNC: 33.2 G/DL (ref 33–36)
MCV RBC AUTO: 95.9 FL (ref 80–94)
MONOCYTES # BLD AUTO: 0.59 X10(3)/MCL (ref 0.1–1.3)
MONOCYTES NFR BLD AUTO: 12.3 %
NEUTROPHILS # BLD AUTO: 2.56 X10(3)/MCL (ref 2.1–9.2)
NEUTROPHILS NFR BLD AUTO: 53.3 %
PLATELET # BLD AUTO: 268 X10(3)/MCL (ref 130–400)
PMV BLD AUTO: 10.1 FL (ref 7.4–10.4)
POTASSIUM SERPL-SCNC: 4 MMOL/L (ref 3.5–5.1)
PROT SERPL-MCNC: 7.8 GM/DL (ref 5.8–7.6)
RBC # BLD AUTO: 4.14 X10(6)/MCL (ref 4.2–5.4)
SODIUM SERPL-SCNC: 140 MMOL/L (ref 136–145)
WBC # BLD AUTO: 4.81 X10(3)/MCL (ref 4.5–11.5)

## 2025-05-14 PROCEDURE — 82378 CARCINOEMBRYONIC ANTIGEN: CPT

## 2025-05-14 PROCEDURE — 85025 COMPLETE CBC W/AUTO DIFF WBC: CPT

## 2025-05-14 PROCEDURE — 80053 COMPREHEN METABOLIC PANEL: CPT

## 2025-05-14 PROCEDURE — 36415 COLL VENOUS BLD VENIPUNCTURE: CPT

## 2025-05-14 PROCEDURE — 86300 IMMUNOASSAY TUMOR CA 15-3: CPT

## 2025-05-15 LAB — CANCER AG27-29 SERPL-ACNC: <12 U/ML

## 2025-05-20 ENCOUNTER — TELEPHONE (OUTPATIENT)
Dept: INTERNAL MEDICINE | Facility: CLINIC | Age: 70
End: 2025-05-20
Payer: MEDICARE

## 2025-05-20 DIAGNOSIS — E03.9 HYPOTHYROIDISM, UNSPECIFIED TYPE: Primary | ICD-10-CM

## 2025-05-20 NOTE — TELEPHONE ENCOUNTER
Copied from CRM #[id number]. Topic: Appointments - Amb Referral  >> May 20, 2025  3:31 PM Sen wrote:  Who Called: Ann-Marie Perea    Caller is requesting assistance/information from provider's office.    Symptoms (please be specific):    How long has patient had these symptoms:    List of preferred pharmacies on file (remove unneeded): [unfilled]  If different, enter pharmacy into here including location and phone number:       Preferred Method of Contact: Phone Call  Patient's Preferred Phone Number on File: [phone]   Best Call Back Number, if different:  Additional Information: Pt is requesting nurse to give her a call about blood work.

## 2025-05-20 NOTE — TELEPHONE ENCOUNTER
Spoke to pt who stated that she is seeing a natropath, Mily Zheng, and she would like the following labs ordered:  TSH  Free T3  Free T4  TPO AB    Pt stated that if she has them done with Mily Zheng then insurance will not cover that labs.   Okay to order?

## 2025-05-21 ENCOUNTER — OFFICE VISIT (OUTPATIENT)
Dept: HEMATOLOGY/ONCOLOGY | Facility: CLINIC | Age: 70
End: 2025-05-21
Payer: MEDICARE

## 2025-05-21 VITALS
HEIGHT: 60 IN | OXYGEN SATURATION: 98 % | WEIGHT: 138 LBS | DIASTOLIC BLOOD PRESSURE: 80 MMHG | HEART RATE: 109 BPM | RESPIRATION RATE: 15 BRPM | BODY MASS INDEX: 27.09 KG/M2 | TEMPERATURE: 98 F | SYSTOLIC BLOOD PRESSURE: 124 MMHG

## 2025-05-21 DIAGNOSIS — Z17.0 MALIGNANT NEOPLASM OF UPPER-OUTER QUADRANT OF LEFT BREAST IN FEMALE, ESTROGEN RECEPTOR POSITIVE: Primary | ICD-10-CM

## 2025-05-21 DIAGNOSIS — C50.412 MALIGNANT NEOPLASM OF UPPER-OUTER QUADRANT OF LEFT BREAST IN FEMALE, ESTROGEN RECEPTOR POSITIVE: Primary | ICD-10-CM

## 2025-05-21 PROCEDURE — 99214 OFFICE O/P EST MOD 30 MIN: CPT | Mod: PBBFAC | Performed by: INTERNAL MEDICINE

## 2025-05-21 PROCEDURE — 99999 PR PBB SHADOW E&M-EST. PATIENT-LVL IV: CPT | Mod: PBBFAC,,, | Performed by: INTERNAL MEDICINE

## 2025-05-22 DIAGNOSIS — M81.0 OSTEOPOROSIS, UNSPECIFIED OSTEOPOROSIS TYPE, UNSPECIFIED PATHOLOGICAL FRACTURE PRESENCE: ICD-10-CM

## 2025-05-22 RX ORDER — RISEDRONATE SODIUM 150 MG/1
TABLET, FILM COATED ORAL
Qty: 3 TABLET | Refills: 3 | Status: SHIPPED | OUTPATIENT
Start: 2025-05-22

## 2025-06-02 ENCOUNTER — LAB VISIT (OUTPATIENT)
Dept: LAB | Facility: HOSPITAL | Age: 70
End: 2025-06-02
Attending: INTERNAL MEDICINE
Payer: MEDICARE

## 2025-06-02 DIAGNOSIS — E03.9 HYPOTHYROIDISM, UNSPECIFIED TYPE: ICD-10-CM

## 2025-06-02 LAB
T3FREE SERPL-MCNC: 2.86 PG/ML (ref 1.58–3.91)
T4 FREE SERPL-MCNC: 1 NG/DL (ref 0.7–1.48)
TSH SERPL-ACNC: 0.85 UIU/ML (ref 0.35–4.94)

## 2025-06-02 PROCEDURE — 86376 MICROSOMAL ANTIBODY EACH: CPT

## 2025-06-02 PROCEDURE — 36415 COLL VENOUS BLD VENIPUNCTURE: CPT

## 2025-06-02 PROCEDURE — 84443 ASSAY THYROID STIM HORMONE: CPT

## 2025-06-02 PROCEDURE — 84481 FREE ASSAY (FT-3): CPT

## 2025-06-02 PROCEDURE — 84432 ASSAY OF THYROGLOBULIN: CPT

## 2025-06-02 PROCEDURE — 84439 ASSAY OF FREE THYROXINE: CPT

## 2025-06-03 ENCOUNTER — RESULTS FOLLOW-UP (OUTPATIENT)
Dept: INTERNAL MEDICINE | Facility: CLINIC | Age: 70
End: 2025-06-03
Payer: MEDICARE

## 2025-06-03 LAB
ENDOCRINOLOGIST REVIEW: ABNORMAL
THYROGLOB AB SERPL IA-ACNC: 3 IU/ML
THYROGLOB SERPL-MCNC: 19 NG/ML
THYROPEROXIDASE AB SERPL-ACNC: 64.7 IU/ML

## 2025-07-31 ENCOUNTER — TELEPHONE (OUTPATIENT)
Dept: INTERNAL MEDICINE | Facility: CLINIC | Age: 70
End: 2025-07-31
Payer: MEDICARE

## 2025-07-31 DIAGNOSIS — M81.0 AGE-RELATED OSTEOPOROSIS WITHOUT CURRENT PATHOLOGICAL FRACTURE: ICD-10-CM

## 2025-07-31 DIAGNOSIS — E78.2 MODERATE MIXED HYPERLIPIDEMIA NOT REQUIRING STATIN THERAPY: ICD-10-CM

## 2025-07-31 DIAGNOSIS — Z00.00 MEDICARE ANNUAL WELLNESS VISIT, SUBSEQUENT: Primary | ICD-10-CM

## 2025-07-31 DIAGNOSIS — E55.9 VITAMIN D DEFICIENCY: ICD-10-CM

## 2025-07-31 DIAGNOSIS — R03.0 ELEVATED BLOOD PRESSURE READING: ICD-10-CM

## 2025-07-31 DIAGNOSIS — R73.09 ELEVATED GLUCOSE: ICD-10-CM

## 2025-07-31 NOTE — TELEPHONE ENCOUNTER
----- Message from Med Assistant Moss sent at 7/31/2025 12:57 PM CDT -----  Regarding: GARY 8/14/25 @ 2:40 Dr. Hills  1. Are there any outstanding tasks in the patient's chart? Yes, Fasting Labs     2. Does patient have home blood pressure cuff?  [ ] Yes  /   [ ] No  (If yes, please have patient bring to appointment for validation.)    3. Remind patient to bring in a list of medications or bottles of all medications including:   A. All Prescription Medications  B. Over-the-Counter Supplements and/or Vitamins  C. Drops (ear and/or eye)  D. Topical Creams

## 2025-08-07 ENCOUNTER — LAB VISIT (OUTPATIENT)
Dept: LAB | Facility: HOSPITAL | Age: 70
End: 2025-08-07
Attending: INTERNAL MEDICINE
Payer: MEDICARE

## 2025-08-07 DIAGNOSIS — E78.2 MODERATE MIXED HYPERLIPIDEMIA NOT REQUIRING STATIN THERAPY: ICD-10-CM

## 2025-08-07 DIAGNOSIS — R03.0 ELEVATED BLOOD PRESSURE READING: ICD-10-CM

## 2025-08-07 DIAGNOSIS — R73.09 ELEVATED GLUCOSE: ICD-10-CM

## 2025-08-07 DIAGNOSIS — E55.9 VITAMIN D DEFICIENCY: ICD-10-CM

## 2025-08-07 DIAGNOSIS — Z00.00 MEDICARE ANNUAL WELLNESS VISIT, SUBSEQUENT: ICD-10-CM

## 2025-08-07 DIAGNOSIS — M81.0 AGE-RELATED OSTEOPOROSIS WITHOUT CURRENT PATHOLOGICAL FRACTURE: ICD-10-CM

## 2025-08-07 LAB
25(OH)D3+25(OH)D2 SERPL-MCNC: 72 NG/ML (ref 30–80)
ALBUMIN SERPL-MCNC: 4.2 G/DL (ref 3.4–4.8)
ALBUMIN/GLOB SERPL: 1.4 RATIO (ref 1.1–2)
ALP SERPL-CCNC: 56 UNIT/L (ref 40–150)
ALT SERPL-CCNC: 27 UNIT/L (ref 0–55)
ANION GAP SERPL CALC-SCNC: 10 MEQ/L
AST SERPL-CCNC: 28 UNIT/L (ref 11–45)
BACTERIA #/AREA URNS AUTO: ABNORMAL /HPF
BASOPHILS # BLD AUTO: 0.05 X10(3)/MCL
BASOPHILS NFR BLD AUTO: 1.3 %
BILIRUB SERPL-MCNC: 0.6 MG/DL
BILIRUB UR QL STRIP.AUTO: NEGATIVE
BUN SERPL-MCNC: 15.2 MG/DL (ref 9.8–20.1)
CALCIUM SERPL-MCNC: 10 MG/DL (ref 8.4–10.2)
CHLORIDE SERPL-SCNC: 105 MMOL/L (ref 98–107)
CHOLEST SERPL-MCNC: 193 MG/DL
CHOLEST/HDLC SERPL: 2 {RATIO} (ref 0–5)
CLARITY UR: CLEAR
CO2 SERPL-SCNC: 28 MMOL/L (ref 23–31)
COLOR UR AUTO: YELLOW
CREAT SERPL-MCNC: 0.73 MG/DL (ref 0.55–1.02)
CREAT/UREA NIT SERPL: 21
EOSINOPHIL # BLD AUTO: 0.18 X10(3)/MCL (ref 0–0.9)
EOSINOPHIL NFR BLD AUTO: 4.5 %
ERYTHROCYTE [DISTWIDTH] IN BLOOD BY AUTOMATED COUNT: 13.4 % (ref 11.5–17)
EST. AVERAGE GLUCOSE BLD GHB EST-MCNC: 105.4 MG/DL
GFR SERPLBLD CREATININE-BSD FMLA CKD-EPI: >60 ML/MIN/1.73/M2
GLOBULIN SER-MCNC: 2.9 GM/DL (ref 2.4–3.5)
GLUCOSE SERPL-MCNC: 97 MG/DL (ref 82–115)
GLUCOSE UR QL STRIP: NORMAL
HBA1C MFR BLD: 5.3 %
HCT VFR BLD AUTO: 42.9 % (ref 37–47)
HDLC SERPL-MCNC: 83 MG/DL (ref 35–60)
HGB BLD-MCNC: 13.8 G/DL (ref 12–16)
HGB UR QL STRIP: NEGATIVE
IMM GRANULOCYTES # BLD AUTO: 0.01 X10(3)/MCL (ref 0–0.04)
IMM GRANULOCYTES NFR BLD AUTO: 0.3 %
KETONES UR QL STRIP: NEGATIVE
LDLC SERPL CALC-MCNC: 95 MG/DL (ref 50–140)
LEUKOCYTE ESTERASE UR QL STRIP: 75
LYMPHOCYTES # BLD AUTO: 1.44 X10(3)/MCL (ref 0.6–4.6)
LYMPHOCYTES NFR BLD AUTO: 36.4 %
MCH RBC QN AUTO: 31.9 PG (ref 27–31)
MCHC RBC AUTO-ENTMCNC: 32.2 G/DL (ref 33–36)
MCV RBC AUTO: 99.3 FL (ref 80–94)
MONOCYTES # BLD AUTO: 0.55 X10(3)/MCL (ref 0.1–1.3)
MONOCYTES NFR BLD AUTO: 13.9 %
MUCOUS THREADS URNS QL MICRO: ABNORMAL /LPF
NEUTROPHILS # BLD AUTO: 1.73 X10(3)/MCL (ref 2.1–9.2)
NEUTROPHILS NFR BLD AUTO: 43.6 %
NITRITE UR QL STRIP: NEGATIVE
NRBC BLD AUTO-RTO: 0 %
PH UR STRIP: 6 [PH]
PLATELET # BLD AUTO: 253 X10(3)/MCL (ref 130–400)
PMV BLD AUTO: 10.8 FL (ref 7.4–10.4)
POTASSIUM SERPL-SCNC: 4.5 MMOL/L (ref 3.5–5.1)
PROT SERPL-MCNC: 7.1 GM/DL (ref 5.8–7.6)
PROT UR QL STRIP: NEGATIVE
RBC # BLD AUTO: 4.32 X10(6)/MCL (ref 4.2–5.4)
RBC #/AREA URNS AUTO: ABNORMAL /HPF
SODIUM SERPL-SCNC: 143 MMOL/L (ref 136–145)
SP GR UR STRIP.AUTO: 1.02 (ref 1–1.03)
SQUAMOUS #/AREA URNS LPF: ABNORMAL /HPF
TRIGL SERPL-MCNC: 77 MG/DL (ref 37–140)
TSH SERPL-ACNC: 1.07 UIU/ML (ref 0.35–4.94)
UROBILINOGEN UR STRIP-ACNC: NORMAL
VLDLC SERPL CALC-MCNC: 15 MG/DL
WBC # BLD AUTO: 3.96 X10(3)/MCL (ref 4.5–11.5)
WBC #/AREA URNS AUTO: ABNORMAL /HPF

## 2025-08-07 PROCEDURE — 82306 VITAMIN D 25 HYDROXY: CPT

## 2025-08-07 PROCEDURE — 80061 LIPID PANEL: CPT

## 2025-08-07 PROCEDURE — 84443 ASSAY THYROID STIM HORMONE: CPT

## 2025-08-07 PROCEDURE — 80053 COMPREHEN METABOLIC PANEL: CPT

## 2025-08-07 PROCEDURE — 36415 COLL VENOUS BLD VENIPUNCTURE: CPT

## 2025-08-07 PROCEDURE — 83036 HEMOGLOBIN GLYCOSYLATED A1C: CPT

## 2025-08-07 PROCEDURE — 85025 COMPLETE CBC W/AUTO DIFF WBC: CPT

## 2025-08-07 PROCEDURE — 81001 URINALYSIS AUTO W/SCOPE: CPT

## 2025-08-14 ENCOUNTER — OFFICE VISIT (OUTPATIENT)
Dept: INTERNAL MEDICINE | Facility: CLINIC | Age: 70
End: 2025-08-14
Payer: MEDICARE

## 2025-08-14 VITALS
TEMPERATURE: 97 F | OXYGEN SATURATION: 99 % | WEIGHT: 140 LBS | DIASTOLIC BLOOD PRESSURE: 76 MMHG | RESPIRATION RATE: 16 BRPM | SYSTOLIC BLOOD PRESSURE: 132 MMHG | BODY MASS INDEX: 27.48 KG/M2 | HEART RATE: 100 BPM | HEIGHT: 60 IN

## 2025-08-14 DIAGNOSIS — M81.0 OSTEOPOROSIS, UNSPECIFIED OSTEOPOROSIS TYPE, UNSPECIFIED PATHOLOGICAL FRACTURE PRESENCE: ICD-10-CM

## 2025-08-14 DIAGNOSIS — Z17.0 MALIGNANT NEOPLASM OF UPPER-OUTER QUADRANT OF LEFT BREAST IN FEMALE, ESTROGEN RECEPTOR POSITIVE: ICD-10-CM

## 2025-08-14 DIAGNOSIS — C50.412 MALIGNANT NEOPLASM OF UPPER-OUTER QUADRANT OF LEFT BREAST IN FEMALE, ESTROGEN RECEPTOR POSITIVE: ICD-10-CM

## 2025-08-14 DIAGNOSIS — Z00.00 MEDICARE ANNUAL WELLNESS VISIT, SUBSEQUENT: Primary | ICD-10-CM

## 2025-08-14 DIAGNOSIS — E78.2 MIXED HYPERLIPIDEMIA: ICD-10-CM

## 2025-08-14 RX ORDER — CALCIUM CARB, CITRATE, MALATE 250 MG
1 CAPSULE ORAL 2 TIMES DAILY
COMMUNITY
Start: 2025-06-10

## 2025-08-14 RX ORDER — MULTIVITAMIN
1 TABLET ORAL 2 TIMES DAILY
COMMUNITY

## 2025-08-14 RX ORDER — CHLORPHENIRAMINE MALEATE 4 MG
4 TABLET ORAL DAILY
COMMUNITY
Start: 2025-06-10

## 2025-09-02 ENCOUNTER — PATIENT MESSAGE (OUTPATIENT)
Dept: INTERNAL MEDICINE | Facility: CLINIC | Age: 70
End: 2025-09-02
Payer: MEDICARE

## 2025-09-02 DIAGNOSIS — D50.9 IRON DEFICIENCY ANEMIA, UNSPECIFIED IRON DEFICIENCY ANEMIA TYPE: Primary | ICD-10-CM

## 2025-09-04 ENCOUNTER — RESULTS FOLLOW-UP (OUTPATIENT)
Dept: INTERNAL MEDICINE | Facility: CLINIC | Age: 70
End: 2025-09-04
Payer: MEDICARE

## (undated) DEVICE — ADHESIVE DERMABOND ADVANCED

## (undated) DEVICE — BAG SPEC RETRV ENDO 4X6IN DISP

## (undated) DEVICE — SOL IRRI STRL WATER 1000ML

## (undated) DEVICE — RELOAD ECHELON ENDOPATH 45MM

## (undated) DEVICE — KIT SURGICAL TURNOVER

## (undated) DEVICE — TRAY CATH 1-LYR URIMTR 16FR

## (undated) DEVICE — SUT MCRYL PLUS 4-0 PS2 27IN

## (undated) DEVICE — CHLORAPREP W TINT 26ML APPL

## (undated) DEVICE — WARMER DRAPE STERILE LF

## (undated) DEVICE — SEALER LIGASURE LAP 37CM 5MM

## (undated) DEVICE — STAPLER ECHELON STAND 45X340MM

## (undated) DEVICE — CORD LAP 10 DISP

## (undated) DEVICE — TROCAR ENDOPATH XCEL 5X100MM

## (undated) DEVICE — SCALPEL #11 BLADE STRL DISP

## (undated) DEVICE — NDL HYPO 22GX1 1/2 SYR 10ML LL

## (undated) DEVICE — CANNULA ENDOPATH XCEL 5X100MM

## (undated) DEVICE — KIT GEN LAPAROSCOPY LAFAYETTE

## (undated) DEVICE — APPLICATOR STRL COT 2INNR 6IN

## (undated) DEVICE — PENCIL E-Z CLEAN ROCKER 15FT

## (undated) DEVICE — IRRIGATOR SUCTION W/TIP

## (undated) DEVICE — GLOVE PROTEXIS HYDROGEL SZ8

## (undated) DEVICE — SCISSOR CURVED ENDOPATH 5MM

## (undated) DEVICE — ELECTRODE PATIENT RETURN DISP

## (undated) DEVICE — SUT VICRYL+ 27 UR-6 VIOL